# Patient Record
Sex: MALE | Race: ASIAN | NOT HISPANIC OR LATINO | Employment: UNEMPLOYED | URBAN - METROPOLITAN AREA
[De-identification: names, ages, dates, MRNs, and addresses within clinical notes are randomized per-mention and may not be internally consistent; named-entity substitution may affect disease eponyms.]

---

## 2018-10-24 ENCOUNTER — OFFICE VISIT - HEALTHEAST (OUTPATIENT)
Dept: FAMILY MEDICINE | Facility: CLINIC | Age: 48
End: 2018-10-24

## 2018-10-24 DIAGNOSIS — M25.561 BILATERAL KNEE PAIN: ICD-10-CM

## 2018-10-24 DIAGNOSIS — M25.562 BILATERAL KNEE PAIN: ICD-10-CM

## 2018-10-24 ASSESSMENT — MIFFLIN-ST. JEOR: SCORE: 1506.37

## 2018-10-30 ENCOUNTER — COMMUNICATION - HEALTHEAST (OUTPATIENT)
Dept: FAMILY MEDICINE | Facility: CLINIC | Age: 48
End: 2018-10-30

## 2018-12-12 ENCOUNTER — OFFICE VISIT - HEALTHEAST (OUTPATIENT)
Dept: FAMILY MEDICINE | Facility: CLINIC | Age: 48
End: 2018-12-12

## 2018-12-12 DIAGNOSIS — M17.0 OSTEOARTHRITIS OF BOTH KNEES, UNSPECIFIED OSTEOARTHRITIS TYPE: ICD-10-CM

## 2018-12-19 ENCOUNTER — AMBULATORY - HEALTHEAST (OUTPATIENT)
Dept: FAMILY MEDICINE | Facility: CLINIC | Age: 48
End: 2018-12-19

## 2018-12-19 ENCOUNTER — RECORDS - HEALTHEAST (OUTPATIENT)
Dept: ADMINISTRATIVE | Facility: OTHER | Age: 48
End: 2018-12-19

## 2019-05-21 ENCOUNTER — OFFICE VISIT - HEALTHEAST (OUTPATIENT)
Dept: FAMILY MEDICINE | Facility: CLINIC | Age: 49
End: 2019-05-21

## 2019-05-21 ENCOUNTER — COMMUNICATION - HEALTHEAST (OUTPATIENT)
Dept: FAMILY MEDICINE | Facility: CLINIC | Age: 49
End: 2019-05-21

## 2019-05-21 DIAGNOSIS — D64.9 ANEMIA, UNSPECIFIED TYPE: ICD-10-CM

## 2019-05-21 DIAGNOSIS — R93.89 ABNORMAL CHEST X-RAY: ICD-10-CM

## 2019-05-21 DIAGNOSIS — R05.9 COUGH: ICD-10-CM

## 2019-05-21 DIAGNOSIS — Z12.11 SCREEN FOR COLON CANCER: ICD-10-CM

## 2019-05-21 DIAGNOSIS — Z00.00 ROUTINE GENERAL MEDICAL EXAMINATION AT A HEALTH CARE FACILITY: ICD-10-CM

## 2019-05-21 DIAGNOSIS — R63.4 WEIGHT LOSS: ICD-10-CM

## 2019-05-21 DIAGNOSIS — E11.9 TYPE 2 DIABETES MELLITUS WITHOUT COMPLICATION, WITHOUT LONG-TERM CURRENT USE OF INSULIN (H): ICD-10-CM

## 2019-05-21 LAB
ALBUMIN SERPL-MCNC: 2.5 G/DL (ref 3.5–5)
ALP SERPL-CCNC: 149 U/L (ref 45–120)
ALT SERPL W P-5'-P-CCNC: 40 U/L (ref 0–45)
ANION GAP SERPL CALCULATED.3IONS-SCNC: 10 MMOL/L (ref 5–18)
AST SERPL W P-5'-P-CCNC: 42 U/L (ref 0–40)
BILIRUB SERPL-MCNC: 0.4 MG/DL (ref 0–1)
BUN SERPL-MCNC: 12 MG/DL (ref 8–22)
CALCIUM SERPL-MCNC: 9 MG/DL (ref 8.5–10.5)
CHLORIDE BLD-SCNC: 102 MMOL/L (ref 98–107)
CHOLEST SERPL-MCNC: 101 MG/DL
CO2 SERPL-SCNC: 22 MMOL/L (ref 22–31)
CREAT SERPL-MCNC: 1.52 MG/DL (ref 0.7–1.3)
ERYTHROCYTE [DISTWIDTH] IN BLOOD BY AUTOMATED COUNT: 20.2 % (ref 11–14.5)
FASTING STATUS PATIENT QL REPORTED: YES
GFR SERPL CREATININE-BSD FRML MDRD: 49 ML/MIN/1.73M2
GLUCOSE BLD-MCNC: 135 MG/DL (ref 70–125)
HBA1C MFR BLD: 10.7 % (ref 3.5–6)
HCT VFR BLD AUTO: 26.3 % (ref 40–54)
HDLC SERPL-MCNC: 32 MG/DL
HGB BLD-MCNC: 7.1 G/DL (ref 14–18)
LDLC SERPL CALC-MCNC: 50 MG/DL
MCH RBC QN AUTO: 17.6 PG (ref 27–34)
MCHC RBC AUTO-ENTMCNC: 27 G/DL (ref 32–36)
MCV RBC AUTO: 65 FL (ref 80–100)
PLATELET # BLD AUTO: 200 THOU/UL (ref 140–440)
POTASSIUM BLD-SCNC: 4.6 MMOL/L (ref 3.5–5)
PROT SERPL-MCNC: 8.5 G/DL (ref 6–8)
RBC # BLD AUTO: 4.04 MILL/UL (ref 4.4–6.2)
SODIUM SERPL-SCNC: 134 MMOL/L (ref 136–145)
T4 FREE SERPL-MCNC: 0.9 NG/DL (ref 0.7–1.8)
TRIGL SERPL-MCNC: 96 MG/DL
TSH SERPL DL<=0.005 MIU/L-ACNC: 3.06 UIU/ML (ref 0.3–5)
WBC: 13.5 THOU/UL (ref 4–11)

## 2019-05-21 ASSESSMENT — MIFFLIN-ST. JEOR: SCORE: 1389.61

## 2019-06-07 ENCOUNTER — ANESTHESIA - HEALTHEAST (OUTPATIENT)
Dept: INTENSIVE CARE | Facility: CLINIC | Age: 49
End: 2019-06-07

## 2019-06-08 ENCOUNTER — RECORDS - HEALTHEAST (OUTPATIENT)
Dept: INTENSIVE CARE | Facility: CLINIC | Age: 49
End: 2019-06-08

## 2019-07-22 ENCOUNTER — OFFICE VISIT - HEALTHEAST (OUTPATIENT)
Dept: FAMILY MEDICINE | Facility: CLINIC | Age: 49
End: 2019-07-22

## 2019-07-22 DIAGNOSIS — A15.0: ICD-10-CM

## 2019-07-22 ASSESSMENT — MIFFLIN-ST. JEOR: SCORE: 1392.63

## 2019-07-23 ENCOUNTER — RECORDS - HEALTHEAST (OUTPATIENT)
Dept: ADMINISTRATIVE | Facility: OTHER | Age: 49
End: 2019-07-23

## 2019-07-23 ENCOUNTER — COMMUNICATION - HEALTHEAST (OUTPATIENT)
Dept: FAMILY MEDICINE | Facility: CLINIC | Age: 49
End: 2019-07-23

## 2019-07-24 ENCOUNTER — COMMUNICATION - HEALTHEAST (OUTPATIENT)
Dept: FAMILY MEDICINE | Facility: CLINIC | Age: 49
End: 2019-07-24

## 2019-08-15 ENCOUNTER — COMMUNICATION - HEALTHEAST (OUTPATIENT)
Dept: FAMILY MEDICINE | Facility: CLINIC | Age: 49
End: 2019-08-15

## 2019-08-30 ENCOUNTER — COMMUNICATION - HEALTHEAST (OUTPATIENT)
Dept: FAMILY MEDICINE | Facility: CLINIC | Age: 49
End: 2019-08-30

## 2019-08-30 DIAGNOSIS — M17.0 OSTEOARTHRITIS OF BOTH KNEES, UNSPECIFIED OSTEOARTHRITIS TYPE: ICD-10-CM

## 2019-09-23 ENCOUNTER — OFFICE VISIT - HEALTHEAST (OUTPATIENT)
Dept: FAMILY MEDICINE | Facility: CLINIC | Age: 49
End: 2019-09-23

## 2019-09-23 DIAGNOSIS — N18.30 TYPE 2 DIABETES MELLITUS WITH STAGE 3 CHRONIC KIDNEY DISEASE, WITHOUT LONG-TERM CURRENT USE OF INSULIN (H): ICD-10-CM

## 2019-09-23 DIAGNOSIS — M17.11 PRIMARY OSTEOARTHRITIS OF RIGHT KNEE: ICD-10-CM

## 2019-09-23 DIAGNOSIS — E11.22 TYPE 2 DIABETES MELLITUS WITH STAGE 3 CHRONIC KIDNEY DISEASE, WITHOUT LONG-TERM CURRENT USE OF INSULIN (H): ICD-10-CM

## 2019-09-23 LAB
ANION GAP SERPL CALCULATED.3IONS-SCNC: 9 MMOL/L (ref 5–18)
BUN SERPL-MCNC: 9 MG/DL (ref 8–22)
CALCIUM SERPL-MCNC: 9.1 MG/DL (ref 8.5–10.5)
CHLORIDE BLD-SCNC: 108 MMOL/L (ref 98–107)
CO2 SERPL-SCNC: 22 MMOL/L (ref 22–31)
CREAT SERPL-MCNC: 1.7 MG/DL (ref 0.7–1.3)
CREAT UR-MCNC: 76.5 MG/DL
GFR SERPL CREATININE-BSD FRML MDRD: 43 ML/MIN/1.73M2
GLUCOSE BLD-MCNC: 102 MG/DL (ref 70–125)
HBA1C MFR BLD: 5.5 % (ref 3.5–6)
MICROALBUMIN UR-MCNC: 15.65 MG/DL (ref 0–1.99)
MICROALBUMIN/CREAT UR: 204.6 MG/G
POTASSIUM BLD-SCNC: 3.8 MMOL/L (ref 3.5–5)
SODIUM SERPL-SCNC: 139 MMOL/L (ref 136–145)

## 2019-09-23 ASSESSMENT — MIFFLIN-ST. JEOR: SCORE: 1422.12

## 2019-09-27 ENCOUNTER — RECORDS - HEALTHEAST (OUTPATIENT)
Dept: ADMINISTRATIVE | Facility: OTHER | Age: 49
End: 2019-09-27

## 2019-10-25 ENCOUNTER — RECORDS - HEALTHEAST (OUTPATIENT)
Dept: ADMINISTRATIVE | Facility: OTHER | Age: 49
End: 2019-10-25

## 2019-11-14 ENCOUNTER — COMMUNICATION - HEALTHEAST (OUTPATIENT)
Dept: FAMILY MEDICINE | Facility: CLINIC | Age: 49
End: 2019-11-14

## 2019-11-20 ENCOUNTER — RECORDS - HEALTHEAST (OUTPATIENT)
Dept: ADMINISTRATIVE | Facility: OTHER | Age: 49
End: 2019-11-20

## 2019-11-27 ENCOUNTER — OFFICE VISIT - HEALTHEAST (OUTPATIENT)
Dept: FAMILY MEDICINE | Facility: CLINIC | Age: 49
End: 2019-11-27

## 2019-11-27 DIAGNOSIS — M17.0 OSTEOARTHRITIS OF BOTH KNEES, UNSPECIFIED OSTEOARTHRITIS TYPE: ICD-10-CM

## 2019-11-27 DIAGNOSIS — Z23 NEED FOR VACCINATION: ICD-10-CM

## 2019-11-27 ASSESSMENT — MIFFLIN-ST. JEOR: SCORE: 1470.23

## 2019-12-09 ENCOUNTER — RECORDS - HEALTHEAST (OUTPATIENT)
Dept: ADMINISTRATIVE | Facility: OTHER | Age: 49
End: 2019-12-09

## 2019-12-27 ENCOUNTER — RECORDS - HEALTHEAST (OUTPATIENT)
Dept: ADMINISTRATIVE | Facility: OTHER | Age: 49
End: 2019-12-27

## 2020-02-05 ENCOUNTER — RECORDS - HEALTHEAST (OUTPATIENT)
Dept: ADMINISTRATIVE | Facility: OTHER | Age: 50
End: 2020-02-05

## 2020-02-28 ENCOUNTER — OFFICE VISIT - HEALTHEAST (OUTPATIENT)
Dept: FAMILY MEDICINE | Facility: CLINIC | Age: 50
End: 2020-02-28

## 2020-02-28 ENCOUNTER — RECORDS - HEALTHEAST (OUTPATIENT)
Dept: ADMINISTRATIVE | Facility: OTHER | Age: 50
End: 2020-02-28

## 2020-02-28 DIAGNOSIS — E11.22 TYPE 2 DIABETES MELLITUS WITH STAGE 3 CHRONIC KIDNEY DISEASE, WITHOUT LONG-TERM CURRENT USE OF INSULIN (H): ICD-10-CM

## 2020-02-28 DIAGNOSIS — M17.0 OSTEOARTHRITIS OF BOTH KNEES, UNSPECIFIED OSTEOARTHRITIS TYPE: ICD-10-CM

## 2020-02-28 DIAGNOSIS — N18.30 TYPE 2 DIABETES MELLITUS WITH STAGE 3 CHRONIC KIDNEY DISEASE, WITHOUT LONG-TERM CURRENT USE OF INSULIN (H): ICD-10-CM

## 2020-02-28 LAB
ALBUMIN SERPL-MCNC: 3.7 G/DL (ref 3.5–5)
ALP SERPL-CCNC: 174 U/L (ref 45–120)
ALT SERPL W P-5'-P-CCNC: 44 U/L (ref 0–45)
ANION GAP SERPL CALCULATED.3IONS-SCNC: 9 MMOL/L (ref 5–18)
AST SERPL W P-5'-P-CCNC: 42 U/L (ref 0–40)
BILIRUB SERPL-MCNC: 0.4 MG/DL (ref 0–1)
BUN SERPL-MCNC: 10 MG/DL (ref 8–22)
CALCIUM SERPL-MCNC: 9.4 MG/DL (ref 8.5–10.5)
CHLORIDE BLD-SCNC: 107 MMOL/L (ref 98–107)
CHOLEST SERPL-MCNC: 115 MG/DL
CO2 SERPL-SCNC: 23 MMOL/L (ref 22–31)
CREAT SERPL-MCNC: 1.3 MG/DL (ref 0.7–1.3)
FASTING STATUS PATIENT QL REPORTED: NO
GFR SERPL CREATININE-BSD FRML MDRD: 58 ML/MIN/1.73M2
GLUCOSE BLD-MCNC: 143 MG/DL (ref 70–125)
HBA1C MFR BLD: 6.7 % (ref 3.5–6)
HDLC SERPL-MCNC: 38 MG/DL
LDLC SERPL CALC-MCNC: 48 MG/DL
POTASSIUM BLD-SCNC: 3.8 MMOL/L (ref 3.5–5)
PROT SERPL-MCNC: 7.9 G/DL (ref 6–8)
SODIUM SERPL-SCNC: 139 MMOL/L (ref 136–145)
TRIGL SERPL-MCNC: 145 MG/DL

## 2020-02-28 ASSESSMENT — MIFFLIN-ST. JEOR: SCORE: 1526.93

## 2020-03-06 ENCOUNTER — RECORDS - HEALTHEAST (OUTPATIENT)
Dept: ADMINISTRATIVE | Facility: OTHER | Age: 50
End: 2020-03-06

## 2020-03-28 ENCOUNTER — COMMUNICATION - HEALTHEAST (OUTPATIENT)
Dept: FAMILY MEDICINE | Facility: CLINIC | Age: 50
End: 2020-03-28

## 2020-05-29 ENCOUNTER — RECORDS - HEALTHEAST (OUTPATIENT)
Dept: ADMINISTRATIVE | Facility: OTHER | Age: 50
End: 2020-05-29

## 2020-06-29 ENCOUNTER — COMMUNICATION - HEALTHEAST (OUTPATIENT)
Dept: FAMILY MEDICINE | Facility: CLINIC | Age: 50
End: 2020-06-29

## 2020-06-30 ENCOUNTER — OFFICE VISIT - HEALTHEAST (OUTPATIENT)
Dept: FAMILY MEDICINE | Facility: CLINIC | Age: 50
End: 2020-06-30

## 2020-06-30 DIAGNOSIS — E11.22 TYPE 2 DIABETES MELLITUS WITH STAGE 3 CHRONIC KIDNEY DISEASE, WITHOUT LONG-TERM CURRENT USE OF INSULIN (H): ICD-10-CM

## 2020-06-30 DIAGNOSIS — N18.30 TYPE 2 DIABETES MELLITUS WITH STAGE 3 CHRONIC KIDNEY DISEASE, WITHOUT LONG-TERM CURRENT USE OF INSULIN (H): ICD-10-CM

## 2020-06-30 DIAGNOSIS — R52 PAIN: ICD-10-CM

## 2020-06-30 DIAGNOSIS — D50.8 OTHER IRON DEFICIENCY ANEMIA: ICD-10-CM

## 2020-06-30 LAB
ALBUMIN SERPL-MCNC: 4 G/DL (ref 3.5–5)
ALP SERPL-CCNC: 159 U/L (ref 45–120)
ALT SERPL W P-5'-P-CCNC: 76 U/L (ref 0–45)
ANION GAP SERPL CALCULATED.3IONS-SCNC: 11 MMOL/L (ref 5–18)
AST SERPL W P-5'-P-CCNC: 48 U/L (ref 0–40)
BILIRUB SERPL-MCNC: 0.3 MG/DL (ref 0–1)
BUN SERPL-MCNC: 13 MG/DL (ref 8–22)
CALCIUM SERPL-MCNC: 9.5 MG/DL (ref 8.5–10.5)
CHLORIDE BLD-SCNC: 105 MMOL/L (ref 98–107)
CO2 SERPL-SCNC: 21 MMOL/L (ref 22–31)
CREAT SERPL-MCNC: 1.45 MG/DL (ref 0.7–1.3)
GFR SERPL CREATININE-BSD FRML MDRD: 51 ML/MIN/1.73M2
GLUCOSE BLD-MCNC: 194 MG/DL (ref 70–125)
HBA1C MFR BLD: 9.2 % (ref 3.5–6)
HGB BLD-MCNC: 10.9 G/DL (ref 14–18)
POTASSIUM BLD-SCNC: 3.9 MMOL/L (ref 3.5–5)
PROT SERPL-MCNC: 8.2 G/DL (ref 6–8)
SODIUM SERPL-SCNC: 137 MMOL/L (ref 136–145)

## 2020-06-30 RX ORDER — ACETAMINOPHEN 325 MG/1
650 TABLET ORAL EVERY 4 HOURS PRN
Qty: 100 TABLET | Refills: 11 | Status: SHIPPED | OUTPATIENT
Start: 2020-06-30 | End: 2021-10-16

## 2020-06-30 ASSESSMENT — MIFFLIN-ST. JEOR: SCORE: 1545.04

## 2020-09-16 ENCOUNTER — RECORDS - HEALTHEAST (OUTPATIENT)
Dept: ADMINISTRATIVE | Facility: OTHER | Age: 50
End: 2020-09-16

## 2020-11-09 ENCOUNTER — OFFICE VISIT - HEALTHEAST (OUTPATIENT)
Dept: FAMILY MEDICINE | Facility: CLINIC | Age: 50
End: 2020-11-09

## 2020-11-09 ENCOUNTER — AMBULATORY - HEALTHEAST (OUTPATIENT)
Dept: FAMILY MEDICINE | Facility: CLINIC | Age: 50
End: 2020-11-09

## 2020-11-09 DIAGNOSIS — E11.22 TYPE 2 DIABETES MELLITUS WITH STAGE 3A CHRONIC KIDNEY DISEASE, WITHOUT LONG-TERM CURRENT USE OF INSULIN (H): ICD-10-CM

## 2020-11-09 DIAGNOSIS — E11.9 TYPE 2 DIABETES MELLITUS (H): ICD-10-CM

## 2020-11-09 DIAGNOSIS — D50.0 IRON DEFICIENCY ANEMIA DUE TO CHRONIC BLOOD LOSS: ICD-10-CM

## 2020-11-09 DIAGNOSIS — Z23 NEED FOR IMMUNIZATION AGAINST INFLUENZA: ICD-10-CM

## 2020-11-09 DIAGNOSIS — L02.03 CARBUNCLE AND FURUNCLE OF FACE: ICD-10-CM

## 2020-11-09 DIAGNOSIS — Z23 NEED FOR VACCINATION: ICD-10-CM

## 2020-11-09 DIAGNOSIS — L02.02 CARBUNCLE AND FURUNCLE OF FACE: ICD-10-CM

## 2020-11-09 DIAGNOSIS — N18.31 TYPE 2 DIABETES MELLITUS WITH STAGE 3A CHRONIC KIDNEY DISEASE, WITHOUT LONG-TERM CURRENT USE OF INSULIN (H): ICD-10-CM

## 2020-11-09 LAB
HBA1C MFR BLD: >14 %
HGB BLD-MCNC: 14.1 G/DL (ref 14–18)

## 2020-11-09 ASSESSMENT — MIFFLIN-ST. JEOR: SCORE: 1405.33

## 2020-11-10 ENCOUNTER — COMMUNICATION - HEALTHEAST (OUTPATIENT)
Dept: SCHEDULING | Facility: CLINIC | Age: 50
End: 2020-11-10

## 2020-11-10 ENCOUNTER — COMMUNICATION - HEALTHEAST (OUTPATIENT)
Dept: FAMILY MEDICINE | Facility: CLINIC | Age: 50
End: 2020-11-10

## 2020-11-10 LAB
ALBUMIN SERPL-MCNC: 4 G/DL (ref 3.5–5)
ALP SERPL-CCNC: 261 U/L (ref 45–120)
ALT SERPL W P-5'-P-CCNC: 85 U/L (ref 0–45)
ANION GAP SERPL CALCULATED.3IONS-SCNC: 11 MMOL/L (ref 5–18)
AST SERPL W P-5'-P-CCNC: 75 U/L (ref 0–40)
BILIRUB SERPL-MCNC: 0.8 MG/DL (ref 0–1)
BUN SERPL-MCNC: 16 MG/DL (ref 8–22)
CALCIUM SERPL-MCNC: 9 MG/DL (ref 8.5–10.5)
CHLORIDE BLD-SCNC: 93 MMOL/L (ref 98–107)
CO2 SERPL-SCNC: 22 MMOL/L (ref 22–31)
CREAT SERPL-MCNC: 1.67 MG/DL (ref 0.7–1.3)
CREAT UR-MCNC: 15.3 MG/DL
GFR SERPL CREATININE-BSD FRML MDRD: 44 ML/MIN/1.73M2
GLUCOSE BLD-MCNC: 860 MG/DL (ref 70–125)
MICROALBUMIN UR-MCNC: 11.25 MG/DL (ref 0–1.99)
MICROALBUMIN/CREAT UR: 735.3 MG/G
POTASSIUM BLD-SCNC: 4 MMOL/L (ref 3.5–5)
PROT SERPL-MCNC: 7.4 G/DL (ref 6–8)
SODIUM SERPL-SCNC: 126 MMOL/L (ref 136–145)

## 2020-11-11 ENCOUNTER — COMMUNICATION - HEALTHEAST (OUTPATIENT)
Dept: FAMILY MEDICINE | Facility: CLINIC | Age: 50
End: 2020-11-11

## 2020-11-12 ENCOUNTER — COMMUNICATION - HEALTHEAST (OUTPATIENT)
Dept: SCHEDULING | Facility: CLINIC | Age: 50
End: 2020-11-12

## 2020-11-17 ENCOUNTER — OFFICE VISIT - HEALTHEAST (OUTPATIENT)
Dept: FAMILY MEDICINE | Facility: CLINIC | Age: 50
End: 2020-11-17

## 2020-11-17 DIAGNOSIS — L30.9 DERMATITIS: ICD-10-CM

## 2020-11-17 DIAGNOSIS — E11.22 TYPE 2 DIABETES MELLITUS WITH STAGE 3A CHRONIC KIDNEY DISEASE, WITHOUT LONG-TERM CURRENT USE OF INSULIN (H): ICD-10-CM

## 2020-11-17 DIAGNOSIS — N18.31 TYPE 2 DIABETES MELLITUS WITH STAGE 3A CHRONIC KIDNEY DISEASE, WITHOUT LONG-TERM CURRENT USE OF INSULIN (H): ICD-10-CM

## 2020-11-17 LAB
ANION GAP SERPL CALCULATED.3IONS-SCNC: 11 MMOL/L (ref 5–18)
BUN SERPL-MCNC: 12 MG/DL (ref 8–22)
CALCIUM SERPL-MCNC: 9.5 MG/DL (ref 8.5–10.5)
CHLORIDE BLD-SCNC: 99 MMOL/L (ref 98–107)
CO2 SERPL-SCNC: 22 MMOL/L (ref 22–31)
CREAT SERPL-MCNC: 1.29 MG/DL (ref 0.7–1.3)
GFR SERPL CREATININE-BSD FRML MDRD: 59 ML/MIN/1.73M2
GLUCOSE BLD-MCNC: 380 MG/DL (ref 70–125)
POTASSIUM BLD-SCNC: 3.9 MMOL/L (ref 3.5–5)
SODIUM SERPL-SCNC: 132 MMOL/L (ref 136–145)

## 2020-11-17 RX ORDER — TRIAMCINOLONE ACETONIDE 1 MG/G
CREAM TOPICAL
Qty: 80 G | Refills: 5 | Status: SHIPPED | OUTPATIENT
Start: 2020-11-17 | End: 2021-10-03

## 2020-12-09 ENCOUNTER — OFFICE VISIT - HEALTHEAST (OUTPATIENT)
Dept: FAMILY MEDICINE | Facility: CLINIC | Age: 50
End: 2020-12-09

## 2020-12-09 DIAGNOSIS — E11.22 TYPE 2 DIABETES MELLITUS WITH STAGE 3A CHRONIC KIDNEY DISEASE, WITHOUT LONG-TERM CURRENT USE OF INSULIN (H): ICD-10-CM

## 2020-12-09 DIAGNOSIS — N18.31 TYPE 2 DIABETES MELLITUS WITH STAGE 3A CHRONIC KIDNEY DISEASE, WITHOUT LONG-TERM CURRENT USE OF INSULIN (H): ICD-10-CM

## 2020-12-09 RX ORDER — GLUCOSAMINE HCL/CHONDROITIN SU 500-400 MG
1 CAPSULE ORAL DAILY
Qty: 50 STRIP | Refills: 11 | Status: SHIPPED | OUTPATIENT
Start: 2020-12-09 | End: 2021-11-22

## 2020-12-09 ASSESSMENT — MIFFLIN-ST. JEOR: SCORE: 1408.86

## 2021-02-12 ENCOUNTER — OFFICE VISIT - HEALTHEAST (OUTPATIENT)
Dept: FAMILY MEDICINE | Facility: CLINIC | Age: 51
End: 2021-02-12

## 2021-02-12 DIAGNOSIS — Z23 NEED FOR VACCINATION: ICD-10-CM

## 2021-02-12 DIAGNOSIS — N18.31 TYPE 2 DIABETES MELLITUS WITH STAGE 3A CHRONIC KIDNEY DISEASE, WITHOUT LONG-TERM CURRENT USE OF INSULIN (H): ICD-10-CM

## 2021-02-12 DIAGNOSIS — Z12.11 SCREEN FOR COLON CANCER: ICD-10-CM

## 2021-02-12 DIAGNOSIS — E11.22 TYPE 2 DIABETES MELLITUS WITH STAGE 3A CHRONIC KIDNEY DISEASE, WITHOUT LONG-TERM CURRENT USE OF INSULIN (H): ICD-10-CM

## 2021-02-12 LAB
CHOLEST SERPL-MCNC: 146 MG/DL
CREAT UR-MCNC: 59.9 MG/DL
FASTING STATUS PATIENT QL REPORTED: YES
HBA1C MFR BLD: 8.4 %
HDLC SERPL-MCNC: 40 MG/DL
LDLC SERPL CALC-MCNC: 80 MG/DL
MICROALBUMIN UR-MCNC: 60.7 MG/DL (ref 0–1.99)
MICROALBUMIN/CREAT UR: 1013.4 MG/G
TRIGL SERPL-MCNC: 129 MG/DL

## 2021-02-12 RX ORDER — METFORMIN HCL 500 MG
1500 TABLET, EXTENDED RELEASE 24 HR ORAL
Qty: 90 TABLET | Refills: 11 | Status: SHIPPED | OUTPATIENT
Start: 2021-02-12 | End: 2022-02-06

## 2021-02-12 ASSESSMENT — MIFFLIN-ST. JEOR: SCORE: 1463.75

## 2021-04-02 ENCOUNTER — RECORDS - HEALTHEAST (OUTPATIENT)
Dept: ADMINISTRATIVE | Facility: OTHER | Age: 51
End: 2021-04-02

## 2021-05-29 NOTE — TELEPHONE ENCOUNTER
Incoming Vocera Call    Call :    Caller's Name: Emery from [facility]  PCP: Dr. Florez  Covering Provider:  Kelsey #:   Penelope Call:     Message: Emery from [facility] is calling for Dr. Florez. Checked and Dr. Florez and his CSS are with pts. Asked Emery if it was urgent I will pull provider from room. He stated that it should be okay for Dr. Florez to call back. Ph# [phone]. Note placed on Dr. Florez's desk.
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
## 2021-05-30 NOTE — PROGRESS NOTES
Assessment: /    Plan:    1. Pulmonary tuberculosis with cavitation         He has appointment at [facility] 7/23.    Recheck in 2 months.    Patient was seen with professional Brianda , Armin Cerna.      Subjective:    HPI:  Jam Mckeno is a 49-year-old male presenting for follow-up of hospitalization for TB.  He was at [facility] 5/21 - 5/31 for TB with cavitation.  He was at [facility] 5/31 - 6/6, developed hemoptysis, and was at [facility] 6/6 - 6/11.  He was then back at [facility] until 7/1, when sputa were negative.  He is on directly observed therapy.    He is feeling stronger, and eating more.    Glucose has been normal, not needing medications.  A1c was 7.9 on 6/1/19.    He takes tylenol once per day for knee pain.      Review of Systems:  No fever or hemoptysis.      Current Outpatient Medications   Medication Sig Dispense Refill     acetaminophen (TYLENOL) 325 MG tablet Take 2 tablets (650 mg total) by mouth every 4 (four) hours as needed.  0     ethambutol (MYAMBUTOL) 400 MG tablet Take 3 tablets (1,200 mg total) by mouth daily. 30 tablet 0     ferrous sulfate 325 (65 FE) MG tablet Take 1 tablet (325 mg total) by mouth daily with breakfast. 30 tablet 0     isoniazid (NYDRAZID) 300 MG tablet Take 1 tablet (300 mg total) by mouth daily. 30 tablet 0     pyrazinamide 500 mg tablet Take 3 tablets (1,500 mg total) by mouth daily. 30 tablet 0     pyridoxine, vitamin B6, (B-6) 50 MG tablet Take 1 tablet (50 mg total) by mouth daily.  0     No current facility-administered medications for this visit.          Objective:    Vitals:    07/22/19 1122   BP: 122/70   Pulse: 93   Resp: 20   Temp: 98  F (36.7  C)   SpO2: 99%       Gen:  NAD, VSS  Eyes with mild conjunctival pallor  Lungs:  normal  Heart:  normal          ADDITIONAL HISTORY SUMMARIZED (2): Reviewed discharge summary.  DECISION TO OBTAIN EXTRA INFORMATION (1): None.   RADIOLOGY TESTS (1): Reviewed CXR's.  LABS (1): Reviewed labs.  MEDICINE TESTS  (1): None.  INDEPENDENT REVIEW (2 each): None.     Total Data Points: 4

## 2021-05-30 NOTE — TELEPHONE ENCOUNTER
Upcoming Appointment Question  When is the appointment: Today  What is your appointment for?: [facility] 3:00 pm 7/23  Who is your appointment scheduled with?: Ask for Jessie  What is your question/concern?: Jessie from TB Clinic is requesting office notes from 7/22 be faxed as soon as possible to [phone] for a 3 pm appt today 7/23.  Okay to leave a detailed message?: Yes

## 2021-05-30 NOTE — TELEPHONE ENCOUNTER
Question following Office Visit  When did you see your provider: 7/22/19  What is your question: TB clinic received notes from visit, needs to clarify that TB clinic is dispensing TB medications and that  patient was not given by PCP.   Okay to leave a detailed message: Yes

## 2021-05-31 NOTE — TELEPHONE ENCOUNTER
CMT attempted to contact the daughter Angela and was forwarded to [employer]. Writer left a detailed message to advise the caller per provider notes below and left call back number in case there are questions. This task is complete.

## 2021-05-31 NOTE — TELEPHONE ENCOUNTER
Describe your symptoms: Daughter reported to TB nurse who reported it to writer that patient is having an increase in leg pain and having increased issues with gait. Unsteady in shower.   The daughter is asking for referral for PCA and order for shower chair to assist patient with bathing. Daughter is concerned he may fall if he continues to do showering alone .  TB nurse does not know DME or any preference on DME.  Please advise.     Any pain: yes  New/Ongoing: Ongoing  How long have you been having symptoms: unknown    Have you been seen for this:  Yes.  Have your symptoms changed since this visit? Yes: increased in pain and increasing lack mobility  Triage offered?: yes patient was not with nurse.  Please call patient for assessment  Home remedies tried: unknown  Pharmacy Name and Location: unknown  Okay to leave a detailed message? No Patient is a Brianda speaker.

## 2021-05-31 NOTE — TELEPHONE ENCOUNTER
For provider-please see [facility] nurse note below and let clinic staff know if MD requires further action. Thank you.

## 2021-05-31 NOTE — TELEPHONE ENCOUNTER
CMT left message x 3. Please review message thread below and advise the patient as indicated. Please schedule if necessary or indicated in message thread. CMT has not received response from caller.

## 2021-05-31 NOTE — TELEPHONE ENCOUNTER
I sent Rx to [facility] for Tramadol 50 mg at bedtime as needed, #7.  It is narcotic, can cause drowsiness, confusion, constipation, dependence.  Call if refill needed.    Shower is durable medical equipment, needs a face-to-face visit with me.  Can do that at his 9/23 appt, or make appt sooner.  shola   WDL

## 2021-05-31 NOTE — TELEPHONE ENCOUNTER
Triage note:    RN returned call to Daughter regarding 49 year old father with TB.  She states that the PCA assessment was done yesterday.      She reports that he has chronic right leg pain since he was in [facility] [place].     She states the pain has been worsening the last 3 years. The pain is so bad he couldn't sleep at night.  Now the pain is in both legs (R>L).  He takes Tylenol and uses cream.  That helps him to sleep some.  He used to take Ibuprofen but can't anymore due to his kidneys.     She says that he has very severe pain in the right leg, but the left leg is about high moderate degree of pain  He is able to walk a little with support by family. The pain is worse in the evening and he is unstable in the shower.  Daughter is asking for the followin.) stronger pain medicine if it is okay for his kidneys (daughter is concerned about the use of medicine with his kidney issues)     2.) order for a shower chair      Pharmacy:  [facility] [address]    *May use any Medical supply store in [place] if needed      *Ok to leave a detailed message upon call back      Meg Cabral RN, [facility] Refill/Triage, 2019 12:50 PM    Reason for Disposition    Nursing judgment    Protocols used: NO PROTOCOL AVAILABLE - SICK ADULT-A-[place]

## 2021-05-31 NOTE — TELEPHONE ENCOUNTER
FYI - Status Update  Who is Calling: Mirela, , [facility]  Update: Patient is doing well.  Am checking to see if Dr wanted patient to see Nephrology at this time . had note on chart about this and wanted to follow up.  Okay to leave a detailed message?:  Yes     Wk:[phone]  Cell: [phone]  Fax? [phone]

## 2021-05-31 NOTE — TELEPHONE ENCOUNTER
He saw nephrology in the hospital.  No need for appointment now.  Will continue to monitor renal function.  shola

## 2021-06-01 NOTE — PROGRESS NOTES
Assessment: /    Plan:    1. Type 2 diabetes mellitus with stage 3 chronic kidney disease, without long-term current use of insulin (H)  Basic Metabolic Panel    Microalbumin, Random Urine    Glycosylated Hemoglobin A1c   2. Primary osteoarthritis of right knee               Subjective:    HPI:  Jam Mckeon is a 49-year-old male presenting for follow-up on diabetes.  He has not been taking medications.     Social Hx:      Review of Systems:        Current Outpatient Medications   Medication Sig Dispense Refill     acetaminophen (TYLENOL) 325 MG tablet Take 2 tablets (650 mg total) by mouth every 4 (four) hours as needed.  0     traMADol (ULTRAM) 50 mg tablet Take 1 tablet (50 mg total) by mouth at bedtime as needed for pain. 7 tablet 0     ethambutol (MYAMBUTOL) 400 MG tablet Take 3 tablets (1,200 mg total) by mouth daily. 30 tablet 0     ferrous sulfate 325 (65 FE) MG tablet Take 1 tablet (325 mg total) by mouth daily with breakfast. 30 tablet 0     pyrazinamide 500 mg tablet Take 3 tablets (1,500 mg total) by mouth daily. 30 tablet 0     pyridoxine, vitamin B6, (B-6) 50 MG tablet Take 1 tablet (50 mg total) by mouth daily.  0     No current facility-administered medications for this visit.          Objective:    Vitals:    09/23/19 1548   BP: 130/80   Pulse: 100   Resp: 20   Temp: 98.1  F (36.7  C)   SpO2: 100%       Gen:  NAD, VSS  Lungs:  normal  Heart:  normal  Abdomen:  No HSM, mass or tenderness        ADDITIONAL HISTORY SUMMARIZED (2): None.  DECISION TO OBTAIN EXTRA INFORMATION (1): None.   RADIOLOGY TESTS (1): None.  LABS (1): None.  MEDICINE TESTS (1): None.  INDEPENDENT REVIEW (2 each): None.     Total Data Points:

## 2021-06-02 VITALS — WEIGHT: 150.7 LBS | BODY MASS INDEX: 23.78 KG/M2

## 2021-06-02 VITALS — WEIGHT: 146.3 LBS | BODY MASS INDEX: 23.09 KG/M2 | HEIGHT: 67 IN | BODY MASS INDEX: 23.78 KG/M2

## 2021-06-02 VITALS — BODY MASS INDEX: 24.28 KG/M2 | WEIGHT: 154.7 LBS | HEIGHT: 67 IN

## 2021-06-03 VITALS — HEIGHT: 66 IN | WEIGHT: 132.25 LBS | BODY MASS INDEX: 21.25 KG/M2

## 2021-06-03 VITALS
OXYGEN SATURATION: 100 % | DIASTOLIC BLOOD PRESSURE: 80 MMHG | HEIGHT: 66 IN | TEMPERATURE: 98.1 F | WEIGHT: 138.75 LBS | SYSTOLIC BLOOD PRESSURE: 130 MMHG | HEART RATE: 100 BPM | RESPIRATION RATE: 20 BRPM | BODY MASS INDEX: 22.3 KG/M2

## 2021-06-03 VITALS — HEIGHT: 66 IN | WEIGHT: 131.5 LBS | BODY MASS INDEX: 21.13 KG/M2

## 2021-06-03 NOTE — TELEPHONE ENCOUNTER
Patient Returning Call  Reason for call:  Mirela is returning call to check on the status of this request.  Information relayed to patient:  Please return call to caller with the status of this message and what she can tell patient. Patient is reporting the same issues daily to the [employer] worker that sees him daily.    Patient has additional questions:  yes  If YES, what are your questions/concerns:  Patient also needs help with filling out his disability forms and caller is requesting information on clinic resources or a  to assist.   Okay to leave a detailed message?: Yes

## 2021-06-03 NOTE — TELEPHONE ENCOUNTER
[facility] called Mirela back. We discussed the patient and his progress. The speaker states that caregivers and family members most immediately involved in his care have been educated regarding suicidal ideation watch and directions for seeking are if the patient needs it.     The patient has complex health alterations and has been struggling with chronic pain involving his knees. The patient is a candidate for surgical correction but the surgical team has indicated that his TB must be entirely treated prior to surgical intervention. This has been rather frustrating for the patient per his report.     [facility] has faxed requested notes (09/23/19 office visit and labs) to Mirela as requested. Fax number verified.     Mirela states that this is an update and is non-emergent, confident that it will be okay to hold for MD as the patient is currently stable. Dr. Florez will return to the medical office 11/15/2019.

## 2021-06-03 NOTE — PROGRESS NOTES
"Assessment: /    Plan:    1. Osteoarthritis of both knees, unspecified osteoarthritis type  Ambulatory referral to Orthopedic Surgery   2. Need for vaccination  Influenza, Recombinant, Inj, Quadrivalent, PF, 18+YRS       I explained that steroid injections or lubricant injections sometimes are helpful for the knees.  It is possible that he might need joint replacement at some point in the future.    Recheck in 3 months.      Subjective:    HPI:  Jam Mckeon is a 49-year-old male presenting with bilateral knee pain.  He had x-rays in October 2018 demonstrating severe right knee osteoarthritis and moderate osteoarthritis on the left.    He has been receiving treatment for pulmonary tuberculosis since June, directly observed therapy through the public health department.    Social Hx: He is accompanied by his daughter.    Review of Systems: No fever or rash.      Current Outpatient Medications   Medication Sig Dispense Refill     acetaminophen (TYLENOL) 325 MG tablet Take 2 tablets (650 mg total) by mouth every 4 (four) hours as needed.  0     ethambutol (MYAMBUTOL) 400 MG tablet Take 3 tablets (1,200 mg total) by mouth daily. 30 tablet 0     ferrous sulfate 325 (65 FE) MG tablet Take 1 tablet (325 mg total) by mouth daily with breakfast. 30 tablet 0     pyrazinamide 500 mg tablet Take 3 tablets (1,500 mg total) by mouth daily. 30 tablet 0     pyridoxine, vitamin B6, (B-6) 50 MG tablet Take 1 tablet (50 mg total) by mouth daily.  0     traMADol (ULTRAM) 50 mg tablet Take 1 tablet (50 mg total) by mouth at bedtime as needed for pain. 7 tablet 0     No current facility-administered medications for this visit.          Objective:    Vitals:    11/27/19 0834 11/27/19 0838 11/27/19 0911   BP: 146/84 140/84 132/82   Pulse: 89     Resp: 20     Temp: 98.6  F (37  C)     TempSrc: Oral     SpO2: 99%     Weight: 149 lb (67.6 kg)     Height: 5' 6.1\" (1.679 m)         Gen:  NAD, VSS  Lungs:  normal  Heart:  normal  Knees with joint " line tenderness bilateral        ADDITIONAL HISTORY SUMMARIZED (2): None.  DECISION TO OBTAIN EXTRA INFORMATION (1): None.   RADIOLOGY TESTS (1): Reviewed knee x-rays.  LABS (1): None.  MEDICINE TESTS (1): None.  INDEPENDENT REVIEW (2 each): None.     Total Data Points: 1

## 2021-06-03 NOTE — TELEPHONE ENCOUNTER
[facility]  Reason for Call:  2 reasons for this call:    Patient has verbalized not wanting to live anymore due to bilateral knee pain to the [facility] nurse that visits him daily to administer his TB medication.  This has been reported to his daughter Elvia and she was aware to watch for signs and report as needed.  The TB provider was also notified and suggested this message to Alonzo Florez MD for mental health follow up.    Caller is requesting office notes from the 9/23/19 visit with labs. Caller stated This request was made in July and the provider treating the TB needs this information. Please fax over this to [phone]   Date of last appointment with primary care: 9/23/19  Okay to leave a detailed message: Yes

## 2021-06-04 VITALS
HEIGHT: 66 IN | RESPIRATION RATE: 20 BRPM | BODY MASS INDEX: 26.6 KG/M2 | OXYGEN SATURATION: 97 % | TEMPERATURE: 98.3 F | DIASTOLIC BLOOD PRESSURE: 66 MMHG | WEIGHT: 165.5 LBS | SYSTOLIC BLOOD PRESSURE: 124 MMHG | HEART RATE: 95 BPM

## 2021-06-04 VITALS
TEMPERATURE: 98.6 F | HEIGHT: 66 IN | RESPIRATION RATE: 20 BRPM | BODY MASS INDEX: 23.95 KG/M2 | WEIGHT: 149 LBS | DIASTOLIC BLOOD PRESSURE: 82 MMHG | HEART RATE: 89 BPM | SYSTOLIC BLOOD PRESSURE: 132 MMHG | OXYGEN SATURATION: 99 %

## 2021-06-04 VITALS
HEART RATE: 113 BPM | OXYGEN SATURATION: 98 % | SYSTOLIC BLOOD PRESSURE: 138 MMHG | TEMPERATURE: 98.1 F | BODY MASS INDEX: 25.96 KG/M2 | HEIGHT: 66 IN | WEIGHT: 161.5 LBS | DIASTOLIC BLOOD PRESSURE: 80 MMHG | RESPIRATION RATE: 22 BRPM

## 2021-06-05 VITALS
DIASTOLIC BLOOD PRESSURE: 74 MMHG | WEIGHT: 137.4 LBS | HEART RATE: 74 BPM | SYSTOLIC BLOOD PRESSURE: 118 MMHG | BODY MASS INDEX: 22.08 KG/M2 | TEMPERATURE: 97.8 F | HEIGHT: 66 IN | OXYGEN SATURATION: 99 %

## 2021-06-05 VITALS
HEIGHT: 66 IN | WEIGHT: 149.5 LBS | HEART RATE: 73 BPM | RESPIRATION RATE: 16 BRPM | DIASTOLIC BLOOD PRESSURE: 72 MMHG | BODY MASS INDEX: 24.03 KG/M2 | TEMPERATURE: 98.6 F | OXYGEN SATURATION: 99 % | SYSTOLIC BLOOD PRESSURE: 110 MMHG

## 2021-06-05 VITALS
DIASTOLIC BLOOD PRESSURE: 66 MMHG | OXYGEN SATURATION: 97 % | SYSTOLIC BLOOD PRESSURE: 108 MMHG | HEART RATE: 73 BPM | TEMPERATURE: 98.5 F | BODY MASS INDEX: 22.99 KG/M2 | WEIGHT: 138 LBS | HEIGHT: 65 IN

## 2021-06-05 VITALS
DIASTOLIC BLOOD PRESSURE: 64 MMHG | RESPIRATION RATE: 18 BRPM | SYSTOLIC BLOOD PRESSURE: 112 MMHG | WEIGHT: 140.25 LBS | HEART RATE: 80 BPM | OXYGEN SATURATION: 97 % | BODY MASS INDEX: 22.64 KG/M2

## 2021-06-06 NOTE — PROGRESS NOTES
"Assessment: /    Plan:    1. Type 2 diabetes mellitus with stage 3 chronic kidney disease, without long-term current use of insulin (H)  Comprehensive Metabolic Panel    Lipid [place] RANDOM    Glycosylated Hemoglobin A1c   2. Osteoarthritis of both knees, unspecified osteoarthritis type         I wrote a prescription for a cane, which he will take to [facility].    Recheck diabetes in 4 months.    Patient was seen with professional Malag[place] , Domi Lu.      Subjective:    HPI:  Jam Mckeon is a 50-year-old male presenting for follow-up on diabetes.  He has been careful about his diet, and has not needed medications.  Prior A1c was 5.5.    He will be finishing medications for TB in 2 weeks, INH and rifampin.  He has been following at [facility].    He had knee injections at orthopedics.  Cold outdoor temperatures have worsened his right knee pain.  He requests a cane.      Review of Systems: No fever or chest pain.      Current Outpatient Medications   Medication Sig Dispense Refill     acetaminophen (TYLENOL) 325 MG tablet Take 2 tablets (650 mg total) by mouth every 4 (four) hours as needed.  0     ferrous sulfate 325 (65 FE) MG tablet Take 1 tablet (325 mg total) by mouth daily with breakfast. 30 tablet 0     traMADol (ULTRAM) 50 mg tablet Take 1 tablet (50 mg total) by mouth at bedtime as needed for pain. 7 tablet 0     No current facility-administered medications for this visit.          Objective:    Vitals:    02/28/20 0843 02/28/20 0846   BP: 144/84 138/80   Pulse: (!) 113    Resp: 22    Temp: 98.1  F (36.7  C)    TempSrc: Oral    SpO2: 98%    Weight: 161 lb 8 oz (73.3 kg)    Height: 5' 6.1\" (1.679 m)        Gen:  NAD, VSS  Lungs:  normal  Heart:  normal  Knees with joint line tenderness bilateral    A1c is 6.7      ADDITIONAL HISTORY SUMMARIZED (2): None.  DECISION TO OBTAIN EXTRA INFORMATION (1): None.   RADIOLOGY TESTS (1): None.  LABS (1): A1c.  MEDICINE TESTS (1): " None.  INDEPENDENT REVIEW (2 each): None.     Total Data Points: 1

## 2021-06-08 ENCOUNTER — COMMUNICATION - HEALTHEAST (OUTPATIENT)
Dept: FAMILY MEDICINE | Facility: CLINIC | Age: 51
End: 2021-06-08

## 2021-06-08 DIAGNOSIS — D50.8 OTHER IRON DEFICIENCY ANEMIA: ICD-10-CM

## 2021-06-09 RX ORDER — FERROUS SULFATE 325(65) MG
TABLET ORAL
Qty: 30 TABLET | Refills: 11 | Status: SHIPPED | OUTPATIENT
Start: 2021-06-09 | End: 2022-05-30

## 2021-06-09 NOTE — PROGRESS NOTES
Assessment: /    Plan:    1. Type 2 diabetes mellitus with stage 3 chronic kidney disease, without long-term current use of insulin (H)  Comprehensive Metabolic Panel    Glycosylated Hemoglobin A1c   2. Other iron deficiency anemia  ferrous sulfate 325 (65 FE) MG tablet    Hemoglobin   3. Pain  acetaminophen (TYLENOL) 325 MG tablet       Recheck in 4 months.    Patient was seen with professional Yemeni telephone .      Subjective:    HPI:  Sak The Toe is a 50-year-old male presenting for follow-up on diabetes.    He finished TB treatment.      Review of Systems:  No fever or cough.      Current Outpatient Medications   Medication Sig Dispense Refill     acetaminophen (TYLENOL) 325 MG tablet Take 2 tablets (650 mg total) by mouth every 4 (four) hours as needed. 100 tablet 11     ferrous sulfate 325 (65 FE) MG tablet Take 1 tablet (325 mg total) by mouth daily with breakfast. 30 tablet 11     No current facility-administered medications for this visit.          Objective:    Vitals:    06/30/20 0923   BP: 124/66   Pulse: 95   Resp: 20   Temp: 98.3  F (36.8  C)   SpO2: 97%       Gen:  NAD, VSS.  Using a cane.  Weight increased 4 # since last visit.  Lungs:  normal  Heart:  normal          ADDITIONAL HISTORY SUMMARIZED (2): None.  DECISION TO OBTAIN EXTRA INFORMATION (1): None.   RADIOLOGY TESTS (1): None.  LABS (1): ordered.  MEDICINE TESTS (1): None.  INDEPENDENT REVIEW (2 each): None.     Total Data Points: 1

## 2021-06-11 ENCOUNTER — AMBULATORY - HEALTHEAST (OUTPATIENT)
Dept: NURSING | Facility: CLINIC | Age: 51
End: 2021-06-11

## 2021-06-11 ENCOUNTER — OFFICE VISIT - HEALTHEAST (OUTPATIENT)
Dept: FAMILY MEDICINE | Facility: CLINIC | Age: 51
End: 2021-06-11

## 2021-06-11 DIAGNOSIS — E11.29 MICROALBUMINURIA DUE TO TYPE 2 DIABETES MELLITUS (H): ICD-10-CM

## 2021-06-11 DIAGNOSIS — R80.9 MICROALBUMINURIA DUE TO TYPE 2 DIABETES MELLITUS (H): ICD-10-CM

## 2021-06-11 DIAGNOSIS — M17.0 OSTEOARTHRITIS OF BOTH KNEES, UNSPECIFIED OSTEOARTHRITIS TYPE: ICD-10-CM

## 2021-06-11 DIAGNOSIS — N18.31 TYPE 2 DIABETES MELLITUS WITH STAGE 3A CHRONIC KIDNEY DISEASE, WITHOUT LONG-TERM CURRENT USE OF INSULIN (H): ICD-10-CM

## 2021-06-11 DIAGNOSIS — E11.22 TYPE 2 DIABETES MELLITUS WITH STAGE 3A CHRONIC KIDNEY DISEASE, WITHOUT LONG-TERM CURRENT USE OF INSULIN (H): ICD-10-CM

## 2021-06-11 LAB — HBA1C MFR BLD: 6 %

## 2021-06-11 RX ORDER — LISINOPRIL 2.5 MG/1
2.5 TABLET ORAL DAILY
Qty: 90 TABLET | Refills: 3 | Status: SHIPPED | OUTPATIENT
Start: 2021-06-11 | End: 2022-06-01

## 2021-06-11 ASSESSMENT — MIFFLIN-ST. JEOR: SCORE: 1497.76

## 2021-06-13 NOTE — TELEPHONE ENCOUNTER
Patient scheduled for appointment with primary care provider 11/17/20. Will route to primary care provider. Dr. Florez is out of office today.

## 2021-06-13 NOTE — PROGRESS NOTES
Assessment: /    Plan:    1. Type 2 diabetes mellitus with stage 3a chronic kidney disease, without long-term current use of insulin (H)  metFORMIN (GLUCOPHAGE XR) 500 MG 24 hr tablet    Basic Metabolic Panel   2. Dermatitis  triamcinolone (KENALOG) 0.1 % cream       Increase Metformin to 2 tablets daily.    Recheck December 9.      Subjective:    HPI:  Jam Mckeon is a 50-year-old male presenting for follow-up on ER visit with hyperglycemia.  His daughter has checked his fingerstick blood sugars using her machine, and has been getting high readings.  He recently started Metformin.    He has irritated areas on the left lower leg.       Review of Systems: No fever or cough.      Current Outpatient Medications   Medication Sig Dispense Refill     acetaminophen (TYLENOL) 325 MG tablet Take 2 tablets (650 mg total) by mouth every 4 (four) hours as needed. 100 tablet 11     ferrous sulfate 325 (65 FE) MG tablet Take 1 tablet (325 mg total) by mouth daily with breakfast. 30 tablet 11     metFORMIN (GLUCOPHAGE XR) 500 MG 24 hr tablet Take 2 tablets (1,000 mg total) by mouth daily with breakfast. 60 tablet 11     triamcinolone (KENALOG) 0.1 % cream Apply 1 gram to lower legs 2 times daily 80 g 5     No current facility-administered medications for this visit.          Objective:    Vitals:    11/17/20 1042   BP: 112/64   Pulse: 80   Resp: 18   SpO2: 97%       Gen:  NAD, VSS  Using a cane  Lungs:  normal  Heart:  normal  Left lower leg with scaly plaques.        ADDITIONAL HISTORY SUMMARIZED (2):  reviewed ER note.  DECISION TO OBTAIN EXTRA INFORMATION (1): None.   RADIOLOGY TESTS (1): None.  LABS (1):  BMP.  MEDICINE TESTS (1): None.  INDEPENDENT REVIEW (2 each): None.     Total Data Points: 3

## 2021-06-13 NOTE — PROGRESS NOTES
Assessment: /    Plan:    1. Type 2 diabetes mellitus with stage 3a chronic kidney disease, without long-term current use of insulin (H)  Comprehensive Metabolic Panel    Glycosylated Hemoglobin A1c    metFORMIN (GLUCOPHAGE XR) 500 MG 24 hr tablet    Microalbumin, Random Urine   2. Iron deficiency anemia due to chronic blood loss  Hemoglobin   3. Carbuncle and furuncle of face         Begin Metformin.  Potential side effects discussed.    Hot packs to the facial lesions 3 times daily.    Recheck diabetes in 1 month.  Recheck sooner if any problem.    He agrees to do colonoscopy.  We will not order this at this time due to diabetes not being well controlled, which would require the procedure to be done at the hospital.    This was a 31 minute visit with >50% of the time spent in face-to-face counseling and coordination of care regarding: Diabetes, anemia, carbuncle.      Subjective:    HPI:  Jam Mckeon is a 50-year-old male presenting for follow-up on diabetes.    He has intentionally lost weight this year, in order to decrease pressure on his painful right knee.  Weight has decreased 27 pounds.  He accomplished this by decreasing his intake.    He has had a small swelling on the right side of the chin for 3 weeks, and on the left side of the chin for 1 week.      Social Hx: He is accompanied by his daughter.    Review of Systems: No fever or cough.      Current Outpatient Medications   Medication Sig Dispense Refill     acetaminophen (TYLENOL) 325 MG tablet Take 2 tablets (650 mg total) by mouth every 4 (four) hours as needed. 100 tablet 11     ferrous sulfate 325 (65 FE) MG tablet Take 1 tablet (325 mg total) by mouth daily with breakfast. 30 tablet 11     metFORMIN (GLUCOPHAGE XR) 500 MG 24 hr tablet Take 1 tablet (500 mg total) by mouth daily with breakfast. 30 tablet 11     No current facility-administered medications for this visit.          Objective:    Vitals:    11/09/20 1342   BP: 108/66   Pulse: 73    Temp: 98.5  F (36.9  C)   SpO2: 97%       Gen:  NAD, VSS  Lungs:  normal  Heart:  normal    Skin with a carbuncle on the right side of the chin.  Light pressure on the area releases a small amount of pus.  No tenderness, no surrounding erythema.  No cervical adenopathy.  There is a furuncle on the left side of the chin.    Feet with no ulceration, normal monofilament testing.      A1c is greater than 14.  Hemoglobin is 14.1, improved.      ADDITIONAL HISTORY SUMMARIZED (2): None.  DECISION TO OBTAIN EXTRA INFORMATION (1): None.   RADIOLOGY TESTS (1): None.  LABS (1): Ordered.  MEDICINE TESTS (1): None.  INDEPENDENT REVIEW (2 each): None.     Total Data Points: 1

## 2021-06-13 NOTE — TELEPHONE ENCOUNTER
"Call received from Alek  with [facility] lab.    He reports a Critical lab: Glucose = 860 (drawn 11/9 @ 13:32)    6/30: Glulcose = 194; A1c = 9.2  2/28: Glulcose = 143; A1c = 6.7    10:13 pm - Attempted to call pt. - No answer    10:16 pm - Spoke to pager , Rakesh. On-call provider, Dr. Keyona Boateng paged    10:22 pm - Call received from Dr. Boateng.  - Attempt to contact pt again.  - If able to contact them, ask about symptoms.  - If they have a glucometer, ask if they have measured their blood glucose. If blood glucose <400 AND without symptoms, send High Priority message to provider  - If symptomatic &/or blood glucose >400, proceed to ER    10:50 pm - Attempted to call pt; Non-detailed VM left to return call to [phone]    11:01 pm - Call received from daughter, Angela Stevenson to Communicate on chart. Notified of Critical Lab    Sak denies symptoms of illness - \"He feels well\". He urinates frequently when he drinks but this has been going on for quite a while    FSBG measured during phone call. Meter reads \"High\"    ER advised    Michelle Major RN  [facility] Nurse Advisor    "

## 2021-06-13 NOTE — PROGRESS NOTES
Assessment: /    Plan:    1. Type 2 diabetes mellitus with stage 3a chronic kidney disease, without long-term current use of insulin (H)  blood glucose meter (GLUCOMETER)    blood glucose test strips    generic lancets (FINGERSTIX LANCETS)       Recheck in 2 months.      Subjective:    HPI:  Jam Mckeon is a 50-year-old male presenting for follow-up on diabetes.  He takes 2 metformin per day.  No GI side effects.        Review of Systems:  No fever or cough.      Current Outpatient Medications   Medication Sig Dispense Refill     acetaminophen (TYLENOL) 325 MG tablet Take 2 tablets (650 mg total) by mouth every 4 (four) hours as needed. 100 tablet 11     blood glucose meter (GLUCOMETER) Use 1 each As Directed daily. Dispense glucometer brand per patient's insurance at pharmacy discretion. 1 each 0     blood glucose test strips Use 1 each As Directed daily. Dispense brand per patient's insurance at pharmacy discretion. 50 strip 11     ferrous sulfate 325 (65 FE) MG tablet Take 1 tablet (325 mg total) by mouth daily with breakfast. 30 tablet 11     generic lancets (FINGERSTIX LANCETS) Use daily.  Dispense brand per patient's insurance at pharmacy discretion. 50 each 11     metFORMIN (GLUCOPHAGE XR) 500 MG 24 hr tablet Take 2 tablets (1,000 mg total) by mouth daily with breakfast. 60 tablet 11     triamcinolone (KENALOG) 0.1 % cream Apply 1 gram to lower legs 2 times daily 80 g 5     No current facility-administered medications for this visit.          Objective:    Vitals:    12/09/20 1238   BP: 118/74   Pulse: 74   Temp: 97.8  F (36.6  C)   SpO2: 99%       Gen:  NAD, VSS  Lungs:  normal  Heart:  normal          ADDITIONAL HISTORY SUMMARIZED (2): None.  DECISION TO OBTAIN EXTRA INFORMATION (1): None.   RADIOLOGY TESTS (1): None.  LABS (1): None.  MEDICINE TESTS (1): None.  INDEPENDENT REVIEW (2 each): None.     Total Data Points: 0

## 2021-06-13 NOTE — TELEPHONE ENCOUNTER
Who is calling:  Patient's daughterAngela  Reason for Call:    Patient in ED today with blood sugar of over 600.  Patient's daughter requesting Provider to review ED visit.  Transferred caller to scheduling to make follow up visit with Provider.  Date of last appointment with primary care: 11/9/2020  Okay to leave a detailed message: Yes

## 2021-06-15 NOTE — PROGRESS NOTES
Assessment & Plan     Jam was seen today for follow-up.    Diagnoses and all orders for this visit:    Type 2 diabetes mellitus with stage 3a chronic kidney disease, without long-term current use of insulin (H)  -     Glycosylated Hemoglobin A1c  -     Microalbumin, Random Urine  -     Lipid [place] FASTING  -     metFORMIN (GLUCOPHAGE XR) 500 MG 24 hr tablet; Take 3 tablets (1,500 mg total) by mouth daily with breakfast.    Screen for colon cancer  -     Ambulatory referral for Colonoscopy    Need for vaccination  -     Tdap vaccine,  6yo or older,  IM  -     Pneumococcal polysaccharide vaccine 23-valent 3 yo or older, subq/IM      Increase metformin to 3 per day.           Return in about 4 months (around 6/12/2021) for DM.    Alonzo Florez MD  [facility]    Subjective   Jam Theh Toe is 51 y.o. and presents today for the following health issues   HPI     Fingerstick 107 today.  Taking 2 metformin.  A1c was >14 last time.      Current Outpatient Medications   Medication Sig Dispense Refill     acetaminophen (TYLENOL) 325 MG tablet Take 2 tablets (650 mg total) by mouth every 4 (four) hours as needed. 100 tablet 11     blood glucose meter (GLUCOMETER) Use 1 each As Directed daily. Dispense glucometer brand per patient's insurance at pharmacy discretion. 1 each 0     blood glucose test strips Use 1 each As Directed daily. Dispense brand per patient's insurance at pharmacy discretion. 50 strip 11     ferrous sulfate 325 (65 FE) MG tablet Take 1 tablet (325 mg total) by mouth daily with breakfast. 30 tablet 11     generic lancets (FINGERSTIX LANCETS) Use daily.  Dispense brand per patient's insurance at pharmacy discretion. 50 each 11     metFORMIN (GLUCOPHAGE XR) 500 MG 24 hr tablet Take 3 tablets (1,500 mg total) by mouth daily with breakfast. 90 tablet 11     triamcinolone (KENALOG) 0.1 % cream Apply 1 gram to lower legs 2 times daily 80 g 5     No current facility-administered  "medications for this visit.          Review of Systems  No fever or cough.      Objective    /72   Pulse 73   Temp 98.6  F (37  C) (Oral)   Resp 16   Ht 5' 5.55\" (1.665 m)   Wt 149 lb 8 oz (67.8 kg)   SpO2 99%   BMI 24.46 kg/m    Body mass index is 24.46 kg/m .  Physical Exam  Heart normal  Lungs normal  Feet: no ulcer, normal monofilament testing  Using a cane    A1c 8.4              "

## 2021-06-16 PROBLEM — N18.30 STAGE 3 CHRONIC KIDNEY DISEASE (H): Status: ACTIVE | Noted: 2019-06-06

## 2021-06-16 PROBLEM — A15.9 TUBERCULOSIS: Status: ACTIVE | Noted: 2019-05-31

## 2021-06-16 PROBLEM — M17.0 OSTEOARTHRITIS OF BOTH KNEES, UNSPECIFIED OSTEOARTHRITIS TYPE: Status: ACTIVE | Noted: 2019-11-27

## 2021-06-16 PROBLEM — N18.30 TYPE 2 DIABETES MELLITUS WITH STAGE 3 CHRONIC KIDNEY DISEASE, WITHOUT LONG-TERM CURRENT USE OF INSULIN (H): Status: ACTIVE | Noted: 2019-05-21

## 2021-06-16 PROBLEM — E11.22 TYPE 2 DIABETES MELLITUS WITH STAGE 3 CHRONIC KIDNEY DISEASE, WITHOUT LONG-TERM CURRENT USE OF INSULIN (H): Status: ACTIVE | Noted: 2019-05-21

## 2021-06-16 PROBLEM — D50.0 IRON DEFICIENCY ANEMIA DUE TO CHRONIC BLOOD LOSS: Status: ACTIVE | Noted: 2019-06-06

## 2021-06-16 PROBLEM — A15.0: Status: ACTIVE | Noted: 2019-05-21

## 2021-06-20 NOTE — LETTER
Letter by Alonzo Florez MD at      Author: Alonzo Florez MD Service: -- Author Type: --    Filed:  Encounter Date: 3/28/2020 Status: (Other)         Sak Theh Toe  [address] [place] [address]             March 28, 2020         Hi Sak,    Below are the results from your visit:  Your lab results are good.    Resulted Orders   Comprehensive Metabolic Panel   Result Value Ref Range    Sodium 139 136 - 145 mmol/L    Potassium 3.8 3.5 - 5.0 mmol/L    Chloride 107 98 - 107 mmol/L    CO2 23 22 - 31 mmol/L    Anion Gap, Calculation 9 5 - 18 mmol/L    Glucose 143 (H) 70 - 125 mg/dL    BUN 10 8 - 22 mg/dL    Creatinine 1.30 0.70 - 1.30 mg/dL    GFR MDRD Af Amer >60 >60 mL/min/1.73m2    GFR MDRD Non Af Amer 58 (L) >60 mL/min/1.73m2    Bilirubin, Total 0.4 0.0 - 1.0 mg/dL    Calcium 9.4 8.5 - 10.5 mg/dL    Protein, Total 7.9 6.0 - 8.0 g/dL    Albumin 3.7 3.5 - 5.0 g/dL    Alkaline Phosphatase 174 (H) 45 - 120 U/L    AST 42 (H) 0 - 40 U/L    ALT 44 0 - 45 U/L    Narrative    Fasting Glucose reference range is 70-99 mg/dL per  American Diabetes Association (ADA) guidelines.   Lipid [place] RANDOM   Result Value Ref Range    Cholesterol 115 <=199 mg/dL    Triglycerides 145 <=149 mg/dL    HDL Cholesterol 38 (L) >=40 mg/dL    LDL Calculated 48 <=129 mg/dL    Patient Fasting > 8hrs? No    Glycosylated Hemoglobin A1c   Result Value Ref Range    Hemoglobin A1c 6.7 (H) 3.5 - 6.0 %       Please call with questions or contact us using [facility].    Sincerely,        Electronically signed by Alonzo Florez MD

## 2021-06-21 NOTE — PROGRESS NOTES
"Assessment/Plan:        1. Bilateral knee pain  Exam findings were discussed:     Plan:   - XR Knees Bilateral 1 Or 2 VWS; Future  - XR Knees Bilateral AP Standing; Future  - XR Knees Bilateral 1 Or 2 VWS  - XR Knees Bilateral AP Standing        We will follow-up pending the results of the study to manage accordingly       Subjective:    Patient ID:   Sak Leonidas Mckeon is a 48 y.o. male here to establish care.  He is endorsing pain in both knees for the past 4 years, worse with standing, or when pulling to standing from sitting, and the R>L  H/o broken left lower leg.         Review of Systems  General : negative  Ophthalmic : negative  ENT : negative  Respiratory : no cough, shortness of breath, or wheezing  Cardiovascular : no chest pain or dyspnea on exertion  Gastrointestinal : no abdominal pain, change in bowel habits, or black or bloody stools  Genito-Urinary :  no dysuria, trouble voiding, or hematuria  Musculoskeletal : see HPI   Neurological : negative  Dermatological : negative    Allergy: reviewed  Hematological and Lymphatic : negative  Endocrine : negative     The following patient's history were reviewed and updated as appropriate:   He  has no past medical history on file.  He  has a past surgical history that includes No past surgeries.  His family history includes No Medical Problems in his brother and sister.  He  reports that he has quit smoking. His smoking use included Cigarettes. He has never used smokeless tobacco. He reports that he does not drink alcohol or use illicit drugs..      No outpatient encounter prescriptions on file as of 10/24/2018.     No facility-administered encounter medications on file as of 10/24/2018.          Objective:   /60 (Patient Site: Right Arm, Patient Position: Sitting, Cuff Size: Adult Regular)  Pulse (!) 102  Temp 98.5  F (36.9  C) (Oral)   Ht 5' 6.75\" (1.695 m)  Wt 154 lb 11.2 oz (70.2 kg)  SpO2 99%  BMI 24.41 kg/m2      Physical Exam  General " Appearance:    Alert, well hydrated, no distress,    Eyes:    PERRL, conjunctiva/corneas clear,    Throat:   Lips, mucosa, and tongue normal; teeth and gums normal   Neck:   Supple, symmetrical, trachea midline, no adenopathy;        thyroid:  No enlargement/tenderness/nodules; no carotid    bruit or JVD   Lungs:     Clear to auscultation bilaterally, respirations unlabored   Heart:    Regular rate and rhythm, S1 and S2 normal, no murmur, rub   or gallop   Abdomen:     Soft, non-tender, normal bowel sounds, no rebound or guarding, no masses, no organomegaly   Extremities:   Extremities normal, atraumatic, no cyanosis or edema    palpable tenderness to the medial and later inferior patellae and to the posteior joint line, Down to the lateral calf.      Skin:   Skin color, texture, turgor normal, no rashes or lesions

## 2021-06-22 NOTE — PROGRESS NOTES
Patient was scheduled for a physical today and here with his daughter. However, he is 66 yrs of age born in 1952, but his documentations are stating that he is 48 yrs.  I explained to him for us to conduct a 48 yrs of age physical would be very different than the one for a 66 yrs of age and age appropriate physicals and documentations are prudent.     Patient is advised to correct the age on file, and follow up for an Annual wellness exam.   Patient is understanding and agreeable to the plan of care.     This issue was also discussed with HE administration and agree on the plan of care.

## 2021-06-22 NOTE — PROGRESS NOTES
Assessment/Plan:        1. Osteoarthritis of both knees, unspecified osteoarthritis type  X-ray findings were discussed and pathophysiology and treatment options were reviewed   Recommended NSAIDs and physical therapy as the first step of conservative management.       Plan:   - ibuprofen (ADVIL,MOTRIN) 800 MG tablet; Take 1 tablet (800 mg total) by mouth every 8 (eight) hours as needed for pain.  Dispense: 60 tablet; Refill: 2  - Ambulatory referral to PT/OT     consdier injection/ orthopedic.  Consider topical cream.    Follow up in 4-8 wks.     Total time spent with patient was 25  minutes with >50% of time spent in face-to-face counseling regarding the above plan.          Subjective:    Patient ID:   Jam Mckeon is a 48 y.o. male  here with daughter and , and in follow up of chronic knee pains.   His recent x-ray evaluation revealed chronic osteoarthritic, DJD.  In view of his age, the  states that his real date of birth is in 1952, and he is not 48 yrs of age as states on the chart. I advised to have this discrepancy corrected to avoid future problems.            Objective:   /60 (Patient Site: Right Arm, Patient Position: Sitting, Cuff Size: Adult Regular)   Pulse (!) 111   Temp 98.9  F (37.2  C) (Oral)   Wt 150 lb 11.2 oz (68.4 kg)   SpO2 97%   BMI 23.78 kg/m

## 2021-06-25 NOTE — TELEPHONE ENCOUNTER
RN cannot approve Refill Request    RN can NOT refill this medication   Patient request early refill. Medication last filled 6/30/20 for qty 30 refill 11. Provider to advise on request.   . Last office visit: 2/12/2021 Alonzo Florez MD Last Physical: 5/21/2019 Last MTM visit: Visit date not found Last visit same specialty: 2/12/2021 Alonzo Florez MD.  Next visit within 3 mo: Visit date not found  Next physical within 3 mo: Visit date not found      Adrian David, [facility] Triage/Med Refill 6/9/2021    Requested Prescriptions   Pending Prescriptions Disp Refills     FEROSUL 325 mg (65 mg iron) tablet [Pharmacy Med Name: FERROUS SULFATE 325MG (5GR) TABS] 30 tablet 11     Sig: TAKE 1 TABLET(325 MG) BY MOUTH DAILY WITH BREAKFAST       Iron Supplements Refill Protocol Passed - 6/8/2021  3:53 AM        Passed - PCP or prescribing provider visit in past 12 months       Last office visit with prescriber/PCP: 2/12/2021 Alonzo Florez MD OR same dept: 2/12/2021 Alonzo Florez MD OR same specialty: 2/12/2021 Alonzo Florez MD  Last physical: 5/21/2019 Last MTM visit: Visit date not found   Next visit within 3 mo: Visit date not found  Next physical within 3 mo: Visit date not found  Prescriber OR PCP: Alonzo Florez MD  Last diagnosis associated with med order: 1. Other iron deficiency anemia  - FEROSUL 325 mg (65 mg iron) tablet [Pharmacy Med Name: FERROUS SULFATE 325MG (5GR) TABS]; TAKE 1 TABLET(325 MG) BY MOUTH DAILY WITH BREAKFAST  Dispense: 30 tablet; Refill: 11    If protocol passes may refill for 12 months if within 3 months of last provider visit (or a total of 15 months).             Passed - Hemoglobin or Hematocrit in last 12 months     Hemoglobin   Date Value Ref Range Status   11/11/2020 13.8 (L) 14.0 - 18.0 g/dL Final     Hematocrit   Date Value Ref Range Status   11/11/2020 41.1 40.0 - 54.0 % Final

## 2021-06-26 NOTE — PROGRESS NOTES
"    Assessment & Plan     Jam was seen today for diabetes and test results.    Diagnoses and all orders for this visit:    Type 2 diabetes mellitus with stage 3a chronic kidney disease, without long-term current use of insulin (H)  -     Glycosylated Hemoglobin A1c  -     [facility] RED    Microalbuminuria due to type 2 diabetes mellitus (H)  -     lisinopriL (ZESTRIL) 2.5 MG tablet; Take 1 tablet (2.5 mg total) by mouth daily.    Osteoarthritis of both knees, unspecified osteoarthritis type      Diabetes, well controlled.    Microalbuminuria, not controlled.  I explained how an ACE inhibitor can help to prevent renal damage.  Begin lisinopril.  Potential side effects discussed.    He will contact [facility] to arrange for knee injection.    COVID-19 vaccine was administered today.         BMI:   Estimated body mass index is 25.69 kg/m  as calculated from the following:    Height as of this encounter: 5' 5.55\" (1.665 m).    Weight as of this encounter: 157 lb (71.2 kg).       Return in about 4 months (around 10/11/2021) for DM.    Alonzo Florez MD  [facility]    [place]   [facility] The Toe is 51 y.o. and presents today for the following health issues   HPI     He is taking Metformin for diabetes.  He is eating carefully.    He had colonoscopy in April that demonstrated tubular adenomas.  Repeat in 7 years.    He has knee arthritis.  He had injection in May 2020 at [facility].    He has microalbuminuria.      Current Outpatient Medications   Medication Sig Dispense Refill     acetaminophen (TYLENOL) 325 MG tablet Take 2 tablets (650 mg total) by mouth every 4 (four) hours as needed. 100 tablet 11     blood glucose meter (GLUCOMETER) Use 1 each As Directed daily. Dispense glucometer brand per patient's insurance at pharmacy discretion. 1 each 0     blood glucose test strips Use 1 each As Directed daily. Dispense brand per patient's insurance at pharmacy discretion. 50 strip 11     FEROSUL 325 " "mg (65 mg iron) tablet TAKE 1 TABLET(325 MG) BY MOUTH DAILY WITH BREAKFAST 30 tablet 11     generic lancets (FINGERSTIX LANCETS) Use daily.  Dispense brand per patient's insurance at pharmacy discretion. 50 each 11     metFORMIN (GLUCOPHAGE XR) 500 MG 24 hr tablet Take 3 tablets (1,500 mg total) by mouth daily with breakfast. 90 tablet 11     triamcinolone (KENALOG) 0.1 % cream Apply 1 gram to lower legs 2 times daily 80 g 5     lisinopriL (ZESTRIL) 2.5 MG tablet Take 1 tablet (2.5 mg total) by mouth daily. 90 tablet 3     No current facility-administered medications for this visit.          Review of Systems  No fever or cough.      Objective    /76   Pulse 76   Temp 98.2  F (36.8  C) (Oral)   Resp 16   Ht 5' 5.55\" (1.665 m)   Wt 157 lb (71.2 kg)   SpO2 97% Comment: ra  BMI 25.69 kg/m    Body mass index is 25.69 kg/m .  Physical Exam  Heart normal  Lungs normal  Using a cane.    A1c is 6.0, much improved from 8.4 in February and greater than 14 in November.              "

## 2021-07-01 ENCOUNTER — COMMUNICATION - HEALTHEAST (OUTPATIENT)
Dept: FAMILY MEDICINE | Facility: CLINIC | Age: 51
End: 2021-07-01

## 2021-07-01 DIAGNOSIS — R52 PAIN: ICD-10-CM

## 2021-07-01 RX ORDER — ACETAMINOPHEN 325 MG/1
TABLET ORAL
Qty: 100 TABLET | Refills: 11 | Status: SHIPPED | OUTPATIENT
Start: 2021-07-01 | End: 2021-10-16

## 2021-07-04 NOTE — ADDENDUM NOTE
Addendum Note by Alonzo Florez MD at 6/11/2021  9:00 AM     Author: Alonzo Florez MD Service: -- Author Type: Physician    Filed: 6/15/2021  7:34 PM Encounter Date: 6/11/2021 Status: Signed    : Alonzo Florez MD (Physician)    Addended by: ALONZO FLOREZ on: 6/15/2021 07:34 PM        Modules accepted: Level of Service

## 2021-07-04 NOTE — TELEPHONE ENCOUNTER
Telephone Encounter by Charlotte Trujillo RN at 7/1/2021  7:25 AM     Author: Charlotte Trujillo RN Service: -- Author Type: Registered Nurse    Filed: 7/1/2021  7:25 AM Encounter Date: 7/1/2021 Status: Signed    : Charlotte Trujillo RN (Registered Nurse)       RN cannot approve Refill Request    RN can NOT refill this medication med is not covered by policy/route to provider. Last office visit: 6/11/2021 Alonzo Florez MD Last Physical: 5/21/2019 Last MTM visit: Visit date not found Last visit same specialty: 6/11/2021 Alonzo Florez MD.  Next visit within 3 mo: Visit date not found  Next physical within 3 mo: Visit date not found      [facility] Triage/Med Refill 7/1/2021    Requested Prescriptions   Pending Prescriptions Disp Refills   ? acetaminophen (TYLENOL) 325 MG tablet [Pharmacy Med Name: ACETAMINOPHEN 325MG TABLETS] 100 tablet 11     Sig: TAKE 2 TABLETS(650 MG) BY MOUTH EVERY 4 HOURS AS NEEDED       There is no refill protocol information for this order

## 2021-07-06 VITALS
HEIGHT: 66 IN | WEIGHT: 157 LBS | DIASTOLIC BLOOD PRESSURE: 76 MMHG | OXYGEN SATURATION: 97 % | SYSTOLIC BLOOD PRESSURE: 110 MMHG | RESPIRATION RATE: 16 BRPM | HEART RATE: 76 BPM | BODY MASS INDEX: 25.23 KG/M2 | TEMPERATURE: 98.2 F

## 2021-07-09 ENCOUNTER — AMBULATORY - HEALTHEAST (OUTPATIENT)
Dept: NURSING | Facility: CLINIC | Age: 51
End: 2021-07-09

## 2021-10-02 DIAGNOSIS — L30.9 DERMATITIS: ICD-10-CM

## 2021-10-03 DIAGNOSIS — R52 PAIN: Primary | ICD-10-CM

## 2021-10-03 RX ORDER — TRIAMCINOLONE ACETONIDE 1 MG/G
CREAM TOPICAL
Qty: 80 G | Refills: 5 | Status: SHIPPED | OUTPATIENT
Start: 2021-10-03 | End: 2022-03-31

## 2021-10-03 RX ORDER — ACETAMINOPHEN 325 MG/1
TABLET ORAL
Qty: 100 TABLET | Refills: 0 | Status: SHIPPED | OUTPATIENT
Start: 2021-10-03

## 2021-10-03 NOTE — TELEPHONE ENCOUNTER
"Last Written Prescription Date:  7/1/21  Last Fill Quantity: 100,  # refills: 11   Last office visit provider:  6/11/21     Requested Prescriptions   Pending Prescriptions Disp Refills     acetaminophen (TYLENOL) 325 MG tablet [Pharmacy Med Name: ACETAMINOPHEN 325MG TABLETS] 100 tablet      Sig: TAKE 2 TABLETS(650 MG) BY MOUTH EVERY 4 HOURS AS NEEDED       Analgesics (Non-Narcotic Tylenol and ASA Only) Passed - 10/3/2021  3:51 AM        Passed - Recent (12 mo) or future (30 days) visit within the authorizing provider's specialty     Patient has had an office visit with the authorizing provider or a provider within the authorizing providers department within the previous 12 mos or has a future within next 30 days. See \"Patient Info\" tab in inbasket, or \"Choose Columns\" in Meds & Orders section of the refill encounter.              Passed - Patient is 7 months old or older     If patient is a peds patient of the age 7 mos -12 years, ok to refill using weight-based dosing.     If >3g daily and/or sig is not \"prn\", check for liver enzymes. If normal in the last year, ok to refill.  If not, refer to the provider.          Passed - Medication is active on med list             Pat Dick RN 10/03/21 2:25 PM  "

## 2021-10-03 NOTE — TELEPHONE ENCOUNTER
"Last Written Prescription Date:  11/17/2020  Last Fill Quantity: 80 g,  # refills: 5   Last office visit provider:  6/11/21     Requested Prescriptions   Pending Prescriptions Disp Refills     triamcinolone (KENALOG) 0.1 % external cream [Pharmacy Med Name: TRIAMCINOLONE 0.1% CREAM 80GM] 80 g 5     Sig: APPLY 1 GRAM TO LOWER LEGS TWICE DAILY.       Topical Steroids and Nonsteroidals Protocol Passed - 10/2/2021  3:53 AM        Passed - Patient is age 6 or older        Passed - Authorizing prescriber's most recent note related to this medication read.     If refill request is for ophthalmic use, please forward request to provider for approval.          Passed - High potency steroid not ordered        Passed - Recent (12 mo) or future (30 days) visit within the authorizing provider's specialty     Patient has had an office visit with the authorizing provider or a provider within the authorizing providers department within the previous 12 mos or has a future within next 30 days. See \"Patient Info\" tab in inbasket, or \"Choose Columns\" in Meds & Orders section of the refill encounter.              Passed - Medication is active on med list             Simon Castillo RN 10/03/21 9:26 AM  "

## 2021-10-12 ENCOUNTER — OFFICE VISIT (OUTPATIENT)
Dept: FAMILY MEDICINE | Facility: CLINIC | Age: 51
End: 2021-10-12
Payer: COMMERCIAL

## 2021-10-12 VITALS
BODY MASS INDEX: 26.51 KG/M2 | DIASTOLIC BLOOD PRESSURE: 74 MMHG | TEMPERATURE: 98.3 F | HEART RATE: 85 BPM | OXYGEN SATURATION: 98 % | SYSTOLIC BLOOD PRESSURE: 126 MMHG | WEIGHT: 162 LBS

## 2021-10-12 DIAGNOSIS — E11.22 TYPE 2 DIABETES MELLITUS WITH STAGE 3A CHRONIC KIDNEY DISEASE, WITHOUT LONG-TERM CURRENT USE OF INSULIN (H): Primary | ICD-10-CM

## 2021-10-12 DIAGNOSIS — N18.31 TYPE 2 DIABETES MELLITUS WITH STAGE 3A CHRONIC KIDNEY DISEASE, WITHOUT LONG-TERM CURRENT USE OF INSULIN (H): Primary | ICD-10-CM

## 2021-10-12 LAB
ALBUMIN SERPL-MCNC: 3.9 G/DL (ref 3.5–5)
ALP SERPL-CCNC: 106 U/L (ref 45–120)
ALT SERPL W P-5'-P-CCNC: 165 U/L (ref 0–45)
ANION GAP SERPL CALCULATED.3IONS-SCNC: 12 MMOL/L (ref 5–18)
AST SERPL W P-5'-P-CCNC: 88 U/L (ref 0–40)
BILIRUB SERPL-MCNC: 0.5 MG/DL (ref 0–1)
BUN SERPL-MCNC: 14 MG/DL (ref 8–22)
CALCIUM SERPL-MCNC: 9.5 MG/DL (ref 8.5–10.5)
CHLORIDE BLD-SCNC: 106 MMOL/L (ref 98–107)
CO2 SERPL-SCNC: 22 MMOL/L (ref 22–31)
CREAT SERPL-MCNC: 1.18 MG/DL (ref 0.7–1.3)
GFR SERPL CREATININE-BSD FRML MDRD: 71 ML/MIN/1.73M2
GLUCOSE BLD-MCNC: 145 MG/DL (ref 70–125)
HBA1C MFR BLD: 6 % (ref 0–5.6)
POTASSIUM BLD-SCNC: 4 MMOL/L (ref 3.5–5)
PROT SERPL-MCNC: 8.2 G/DL (ref 6–8)
SODIUM SERPL-SCNC: 140 MMOL/L (ref 136–145)

## 2021-10-12 PROCEDURE — 36415 COLL VENOUS BLD VENIPUNCTURE: CPT | Performed by: FAMILY MEDICINE

## 2021-10-12 PROCEDURE — 80053 COMPREHEN METABOLIC PANEL: CPT | Performed by: FAMILY MEDICINE

## 2021-10-12 PROCEDURE — 83036 HEMOGLOBIN GLYCOSYLATED A1C: CPT | Performed by: FAMILY MEDICINE

## 2021-10-12 PROCEDURE — 99213 OFFICE O/P EST LOW 20 MIN: CPT | Performed by: FAMILY MEDICINE

## 2021-10-17 NOTE — PROGRESS NOTES
"    Assessment & Plan     Type 2 diabetes mellitus with stage 3a chronic kidney disease, without long-term current use of insulin (H)    - Hemoglobin A1c  - Comprehensive metabolic panel (BMP + Alb, Alk Phos, ALT, AST, Total. Bili, TP)  - Comprehensive metabolic panel (BMP + Alb, Alk Phos, ALT, AST, Total. Bili, TP)               BMI:   Estimated body mass index is 26.51 kg/m  as calculated from the following:    Height as of 6/11/21: 1.665 m (5' 5.55\").    Weight as of this encounter: 73.5 kg (162 lb).           Return in about 4 months (around 2/12/2022) for DM.    Alonzo Florez MD, MD  [facility] KARYNA Polk is a 51 year old who presents for the following health issues     HPI     Taking metformin.    [facility] goes to [facility].    Current Outpatient Medications   Medication Sig Dispense Refill     acetaminophen (TYLENOL) 325 MG tablet TAKE 2 TABLETS(650 MG) BY MOUTH EVERY 4 HOURS AS NEEDED 100 tablet 0     blood glucose meter (GLUCOMETER) [BLOOD GLUCOSE METER (GLUCOMETER)] Use 1 each As Directed daily. Dispense glucometer brand per patient's insurance at pharmacy discretion. 1 each 0     blood glucose test strips [BLOOD GLUCOSE TEST STRIPS] Use 1 each As Directed daily. Dispense brand per patient's insurance at pharmacy discretion. 50 strip 11     FEROSUL 325 mg (65 mg iron) tablet [FEROSUL 325 MG (65 MG IRON) TABLET] TAKE 1 TABLET(325 MG) BY MOUTH DAILY WITH BREAKFAST 30 tablet 11     generic lancets (FINGERSTIX LANCETS) [GENERIC LANCETS (FINGERSTIX LANCETS)] Use daily.  Dispense brand per patient's insurance at pharmacy discretion. 50 each 11     lisinopriL (ZESTRIL) 2.5 MG tablet [LISINOPRIL (ZESTRIL) 2.5 MG TABLET] Take 1 tablet (2.5 mg total) by mouth daily. 90 tablet 3     metFORMIN (GLUCOPHAGE XR) 500 MG 24 hr tablet [METFORMIN (GLUCOPHAGE XR) 500 MG 24 HR TABLET] Take 3 tablets (1,500 mg total) by mouth daily with breakfast. 90 tablet 11     triamcinolone (KENALOG) 0.1 % " external cream APPLY 1 GRAM TO LOWER LEGS TWICE DAILY. 80 g 5         Review of Systems   No fever or cough.      Objective    /74 (BP Location: Right arm, Patient Position: Sitting, Cuff Size: Adult Regular)   Pulse 85   Temp 98.3  F (36.8  C) (Oral)   Wt 73.5 kg (162 lb)   SpO2 98%   BMI 26.51 kg/m    Body mass index is 26.51 kg/m .  Physical Exam   Heart normal  Lungs normal  Feet normal    A1c 6.0

## 2021-10-27 ENCOUNTER — TRANSFERRED RECORDS (OUTPATIENT)
Dept: HEALTH INFORMATION MANAGEMENT | Facility: CLINIC | Age: 51
End: 2021-10-27
Payer: COMMERCIAL

## 2021-11-02 ENCOUNTER — TRANSFERRED RECORDS (OUTPATIENT)
Dept: HEALTH INFORMATION MANAGEMENT | Facility: CLINIC | Age: 51
End: 2021-11-02
Payer: COMMERCIAL

## 2021-11-02 LAB — RETINOPATHY: NORMAL

## 2021-11-21 DIAGNOSIS — E11.22 TYPE 2 DIABETES MELLITUS WITH STAGE 3A CHRONIC KIDNEY DISEASE, WITHOUT LONG-TERM CURRENT USE OF INSULIN (H): ICD-10-CM

## 2021-11-21 DIAGNOSIS — N18.31 TYPE 2 DIABETES MELLITUS WITH STAGE 3A CHRONIC KIDNEY DISEASE, WITHOUT LONG-TERM CURRENT USE OF INSULIN (H): ICD-10-CM

## 2021-11-21 DIAGNOSIS — Z76.0 ENCOUNTER FOR MEDICATION REFILL: Primary | ICD-10-CM

## 2021-11-22 RX ORDER — BLOOD SUGAR DIAGNOSTIC
STRIP MISCELLANEOUS
Qty: 50 STRIP | Refills: 11 | Status: SHIPPED | OUTPATIENT
Start: 2021-11-22 | End: 2022-12-24

## 2021-12-08 DIAGNOSIS — Z76.0 ENCOUNTER FOR MEDICATION REFILL: Primary | ICD-10-CM

## 2021-12-08 RX ORDER — LANCETS
EACH MISCELLANEOUS
Qty: 200 EACH | Refills: 3 | Status: SHIPPED | OUTPATIENT
Start: 2021-12-08 | End: 2022-12-24

## 2022-02-04 DIAGNOSIS — N18.31 TYPE 2 DIABETES MELLITUS WITH STAGE 3A CHRONIC KIDNEY DISEASE, WITHOUT LONG-TERM CURRENT USE OF INSULIN (H): ICD-10-CM

## 2022-02-04 DIAGNOSIS — E11.22 TYPE 2 DIABETES MELLITUS WITH STAGE 3A CHRONIC KIDNEY DISEASE, WITHOUT LONG-TERM CURRENT USE OF INSULIN (H): ICD-10-CM

## 2022-02-04 DIAGNOSIS — Z76.0 ENCOUNTER FOR MEDICATION REFILL: Primary | ICD-10-CM

## 2022-02-06 RX ORDER — METFORMIN HCL 500 MG
TABLET, EXTENDED RELEASE 24 HR ORAL
Qty: 90 TABLET | Refills: 11 | Status: SHIPPED | OUTPATIENT
Start: 2022-02-06 | End: 2022-10-18

## 2022-02-15 ENCOUNTER — OFFICE VISIT (OUTPATIENT)
Dept: FAMILY MEDICINE | Facility: CLINIC | Age: 52
End: 2022-02-15
Payer: COMMERCIAL

## 2022-02-15 VITALS
BODY MASS INDEX: 27.98 KG/M2 | WEIGHT: 171 LBS | OXYGEN SATURATION: 97 % | TEMPERATURE: 97.6 F | SYSTOLIC BLOOD PRESSURE: 122 MMHG | HEART RATE: 80 BPM | DIASTOLIC BLOOD PRESSURE: 76 MMHG

## 2022-02-15 DIAGNOSIS — R74.01 ELEVATED AST (SGOT): ICD-10-CM

## 2022-02-15 DIAGNOSIS — Z23 HIGH PRIORITY FOR 2019 NOVEL CORONAVIRUS VACCINATION: ICD-10-CM

## 2022-02-15 DIAGNOSIS — M17.0 OSTEOARTHRITIS OF BOTH KNEES, UNSPECIFIED OSTEOARTHRITIS TYPE: ICD-10-CM

## 2022-02-15 DIAGNOSIS — E11.22 TYPE 2 DIABETES MELLITUS WITH STAGE 3A CHRONIC KIDNEY DISEASE, WITHOUT LONG-TERM CURRENT USE OF INSULIN (H): Primary | ICD-10-CM

## 2022-02-15 DIAGNOSIS — Z11.59 NEED FOR HEPATITIS C SCREENING TEST: ICD-10-CM

## 2022-02-15 DIAGNOSIS — N18.31 TYPE 2 DIABETES MELLITUS WITH STAGE 3A CHRONIC KIDNEY DISEASE, WITHOUT LONG-TERM CURRENT USE OF INSULIN (H): Primary | ICD-10-CM

## 2022-02-15 LAB
ALBUMIN SERPL-MCNC: 3.5 G/DL (ref 3.5–5)
ALP SERPL-CCNC: 110 U/L (ref 45–120)
ALT SERPL W P-5'-P-CCNC: 113 U/L (ref 0–45)
ANION GAP SERPL CALCULATED.3IONS-SCNC: 7 MMOL/L (ref 5–18)
AST SERPL W P-5'-P-CCNC: 59 U/L (ref 0–40)
BILIRUB SERPL-MCNC: 0.3 MG/DL (ref 0–1)
BUN SERPL-MCNC: 16 MG/DL (ref 8–22)
CALCIUM SERPL-MCNC: 9.3 MG/DL (ref 8.5–10.5)
CHLORIDE BLD-SCNC: 108 MMOL/L (ref 98–107)
CHOLEST SERPL-MCNC: 150 MG/DL
CO2 SERPL-SCNC: 24 MMOL/L (ref 22–31)
CREAT SERPL-MCNC: 1.48 MG/DL (ref 0.7–1.3)
FASTING STATUS PATIENT QL REPORTED: ABNORMAL
GFR SERPL CREATININE-BSD FRML MDRD: 56 ML/MIN/1.73M2
GLUCOSE BLD-MCNC: 99 MG/DL (ref 70–125)
HBA1C MFR BLD: 6.9 % (ref 0–5.6)
HBV SURFACE AB SERPLBLD QL IA.RAPID: NEGATIVE
HBV SURFACE AG SERPL QL IA: NONREACTIVE
HDLC SERPL-MCNC: 31 MG/DL
HOLD SPECIMEN: NORMAL
LDLC SERPL CALC-MCNC: 76 MG/DL
POTASSIUM BLD-SCNC: 3.9 MMOL/L (ref 3.5–5)
PROT SERPL-MCNC: 8.2 G/DL (ref 6–8)
SODIUM SERPL-SCNC: 139 MMOL/L (ref 136–145)
TRIGL SERPL-MCNC: 213 MG/DL

## 2022-02-15 PROCEDURE — 86803 HEPATITIS C AB TEST: CPT | Performed by: FAMILY MEDICINE

## 2022-02-15 PROCEDURE — 91306 COVID-19,PF,MODERNA (18+ YRS BOOSTER .25ML): CPT | Performed by: FAMILY MEDICINE

## 2022-02-15 PROCEDURE — 0064A COVID-19,PF,MODERNA (18+ YRS BOOSTER .25ML): CPT | Performed by: FAMILY MEDICINE

## 2022-02-15 PROCEDURE — 86704 HEP B CORE ANTIBODY TOTAL: CPT | Performed by: FAMILY MEDICINE

## 2022-02-15 PROCEDURE — 86706 HEP B SURFACE ANTIBODY: CPT | Performed by: FAMILY MEDICINE

## 2022-02-15 PROCEDURE — 87340 HEPATITIS B SURFACE AG IA: CPT | Performed by: FAMILY MEDICINE

## 2022-02-15 PROCEDURE — 80053 COMPREHEN METABOLIC PANEL: CPT | Performed by: FAMILY MEDICINE

## 2022-02-15 PROCEDURE — 99000 SPECIMEN HANDLING OFFICE-LAB: CPT | Performed by: FAMILY MEDICINE

## 2022-02-15 PROCEDURE — 99214 OFFICE O/P EST MOD 30 MIN: CPT | Performed by: FAMILY MEDICINE

## 2022-02-15 PROCEDURE — 36415 COLL VENOUS BLD VENIPUNCTURE: CPT | Performed by: FAMILY MEDICINE

## 2022-02-15 PROCEDURE — 87902 NFCT AGT GNTYP ALYS HEP C: CPT | Performed by: FAMILY MEDICINE

## 2022-02-15 PROCEDURE — 80061 LIPID PANEL: CPT | Performed by: FAMILY MEDICINE

## 2022-02-15 PROCEDURE — 83036 HEMOGLOBIN GLYCOSYLATED A1C: CPT | Performed by: FAMILY MEDICINE

## 2022-02-15 NOTE — LETTER
April 1, 2022      Sak The Toe  [address] [facility] [address]        Hi Sak,    As we discussed by phone, your hepatitis C test is positive.  I entered a referral to a liver specialist.    Resulted Orders   Hemoglobin A1c   Result Value Ref Range    Hemoglobin A1C 6.9 (H) 0.0 - 5.6 %      Comment:      Normal <5.7%   Prediabetes 5.7-6.4%    Diabetes 6.5% or higher     Note: Adopted from ADA consensus guidelines.   Hepatitis C Screen Reflex to HCV RNA Quant and Genotype   Result Value Ref Range    Hepatitis C Antibody Reactive (A) Nonreactive      Comment:      A reactive result indicates one of the following   1) Current HCV infection   2) Past HCV infection that has resolved or   3) False positivity.     The CDC recommends that a reactive result should be followed by Nucleic acid testing for HCV RNA. If HCV RNA is detected, that indicates current HCV infection.   If HCV RNA is not detected, that indicates either past, resolved HCV infection, or false HCV antibody positivity.    Narrative    Assay performance characteristics have not been established for newborns, infants, and children.   Lipid panel reflex to direct LDL Fasting   Result Value Ref Range    Cholesterol 150 <=199 mg/dL    Triglycerides 213 (H) <=149 mg/dL    Direct Measure HDL 31 (L) >=40 mg/dL      Comment:      HDL Cholesterol Reference Range:     0-2 years:   No reference ranges established for patients under 2 years old  at [facility] for lipid analytes.    2-8 years:  Greater than 45 mg/dL     18 years and older:   Female: Greater than or equal to 50 mg/dL   Male:   Greater than or equal to 40 mg/dL    LDL Cholesterol Calculated 76 <=129 mg/dL    Patient Fasting > 8hrs? Unknown    Comprehensive metabolic panel (BMP + Alb, Alk Phos, ALT, AST, Total. Bili, TP)   Result Value Ref Range    Sodium 139 136 - 145 mmol/L    Potassium 3.9 3.5 - 5.0 mmol/L    Chloride 108 (H) 98 - 107 mmol/L    Carbon Dioxide (CO2) 24 22 - 31 mmol/L     Anion Gap 7 5 - 18 mmol/L    Urea Nitrogen 16 8 - 22 mg/dL    Creatinine 1.48 (H) 0.70 - 1.30 mg/dL    Calcium 9.3 8.5 - 10.5 mg/dL    Glucose 99 70 - 125 mg/dL    Alkaline Phosphatase 110 45 - 120 U/L    AST 59 (H) 0 - 40 U/L     (H) 0 - 45 U/L    Protein Total 8.2 (H) 6.0 - 8.0 g/dL    Albumin 3.5 3.5 - 5.0 g/dL    Bilirubin Total 0.3 0.0 - 1.0 mg/dL    GFR Estimate 56 (L) >60 mL/min/1.73m2      Comment:      Effective December 21, 2021 eGFRcr in adults is calculated using the 2021 CKD-EPI creatinine equation which includes age and gender (Leodan et al., NEJ, DOI: 10.1056/KPPBst8943354)   Hepatitis B Surface Antibody   Result Value Ref Range    Hepatitis B Surface Antibody Negative Negative   Hepatitis B surface antigen   Result Value Ref Range    Hepatitis B Surface Antigen Nonreactive Nonreactive   Hepatitis B core antibody   Result Value Ref Range    Hepatitis B Core Antibody Total Positive (A) Negative   Hepatitis C RNA, Quantitative by PCR with Confirmatory Reflex to Genotyping   Result Value Ref Range    Hepatitis C log 5.0     Hepatitis C RNA IU/mL, Instrument 105,024 (H) <1 IU/mL    Narrative    The GLEN AmpliPrep/GLEN TaqMan HCV test is a FDA-approved in vitro nucleic acid amplification test for the quantitation of HCV DNA in human plasma (EDTA plasma) or serum using the GLEN AmpliPrep instrument for automated viral nucleic acid extraction and the GLEN TaqMan for the automated real-time PCR amplification and detection of viral nucleic acid target. Titer results are reported in International Units/mL (IU/mL) using the 1st WHO International standard for HBV for nucleic acid amplification assays.   Hepatitis C High Resolution Genotype   Result Value Ref Range    Hepatitis C High Resolution Indeterminate       Comment:      Hepatitis C genotyping is indeterminate. This test may be  unsuccessful if the HCV RNA viral load is less than  log 5.0 or 100,000 IU per mL. In addition to low  viral load,  other conditions, such as PCR inhibitors, viral  genetic variation, etc., may cause RT-PCR failure resulting  in an indeterminate result.  INTERPRETIVE INFORMATION: Hepatitis C High Resolution   Genotype     Hepatitis C viral RNA is assayed using reverse   transcription polymerase chain reaction (RT-PCR) to amplify   specific portions of both the Core and NS5B regions of the   viral genome. The amplified nucleic acid is sequenced   bi-directionally using dye-terminator chemistry ([employer]).   Sequencing data is compared to a database of characterized   sequences.     Isolates of hepatitis C virus are grouped into six major   genotypes(1-6). These genotypes are subtyped according to   sequence characteristics. Sequencing both the Core and NS5B   regions allows for subtyping of all confirmed and most   provisional  genotypes, including differentiation of 1a from   1b and typing of genotype 6.    This test was developed and its performance characteristics   determined by [facility]. It has not been cleared or   approved by the US Food and Drug Administration. This test   was performed in a CLIA certified laboratory and is   intended for clinical purposes.  Performed By: [facility]  [address] [place] [id number]  : Glo Thomason MD       If you have any questions or concerns, please call the clinic at the number listed above.       Sincerely,      Alonzo Florez MD

## 2022-02-16 LAB
HBV CORE AB SERPL QL IA: POSITIVE
HCV AB SERPL QL IA: REACTIVE

## 2022-02-17 LAB
HCV RNA SERPL NAA+PROBE-ACNC: ABNORMAL IU/ML
HCV RNA SERPL NAA+PROBE-LOG IU: 5 {LOG_IU}/ML

## 2022-02-18 ENCOUNTER — TRANSFERRED RECORDS (OUTPATIENT)
Dept: HEALTH INFORMATION MANAGEMENT | Facility: CLINIC | Age: 52
End: 2022-02-18
Payer: COMMERCIAL

## 2022-02-23 LAB — HCV GENTYP SERPL NAA+PROBE: NORMAL

## 2022-02-25 NOTE — PROGRESS NOTES
"  Assessment & Plan     Type 2 diabetes mellitus with stage 3a chronic kidney disease, without long-term current use of insulin (H)    Stable.  Continue current medication.    - Lipid panel reflex to direct LDL Fasting  - Albumin Random Urine Quantitative with Creat Ratio  - Hemoglobin A1c  - Lipid panel reflex to direct LDL Fasting    Need for hepatitis C screening test    - Hepatitis C Screen Reflex to HCV RNA Quant and Genotype  - Hepatitis C Screen Reflex to HCV RNA Quant and Genotype  - Hepatitis C RNA, Quantitative by PCR with Confirmatory Reflex to Genotyping  - Hepatitis C High Resolution Genotype    High priority for 2019 novel coronavirus vaccination    - COVID-19,PF,MODERNA (18+ YRS BOOSTER .25ML)    Elevated AST (SGOT)    Check hepatitis studies.    - Comprehensive metabolic panel (BMP + Alb, Alk Phos, ALT, AST, Total. Bili, TP)  - Hepatitis B Surface Antibody  - Hepatitis B surface antigen  - Hepatitis B core antibody  - Comprehensive metabolic panel (BMP + Alb, Alk Phos, ALT, AST, Total. Bili, TP)  - Hepatitis B Surface Antibody  - Hepatitis B surface antigen  - Hepatitis B core antibody    Osteoarthritis of both knees, unspecified osteoarthritis type    Continue to see Ortho.      30 minutes spent on the date of the encounter doing chart review, review of test results and patient visit        BMI:   Estimated body mass index is 27.98 kg/m  as calculated from the following:    Height as of 6/11/21: 1.665 m (5' 5.55\").    Weight as of this encounter: 77.6 kg (171 lb).           Return in about 4 months (around 6/15/2022) for DM.    Alonzo Florez MD, MD  [facility] is [facility] 52 [facility] who presents for the following health issues     HPI     Diabetes follow-up.  Taking metformin.    Weight increased 11 # since last visit.    Prior AST 88, .    He goes to Ortho for knees.  Steroid injections help, but have not been lasting as long.      Current Outpatient " Medications   Medication Sig Dispense Refill     ACCU-CHEK GUIDE test strip TEST DAILY 50 strip 11     acetaminophen (TYLENOL) 325 MG tablet TAKE 2 TABLETS(650 MG) BY MOUTH EVERY 4 HOURS AS NEEDED 100 tablet 0     blood glucose meter (GLUCOMETER) [BLOOD GLUCOSE METER (GLUCOMETER)] Use 1 each As Directed daily. Dispense glucometer brand per patient's insurance at pharmacy discretion. 1 each 0     blood glucose monitoring (SOFTCLIX) lancets TEST DAILY 200 each 3     FEROSUL 325 mg (65 mg iron) tablet [FEROSUL 325 MG (65 MG IRON) TABLET] TAKE 1 TABLET(325 MG) BY MOUTH DAILY WITH BREAKFAST 30 tablet 11     generic lancets (FINGERSTIX LANCETS) [GENERIC LANCETS (FINGERSTIX LANCETS)] Use daily.  Dispense brand per patient's insurance at pharmacy discretion. 50 each 11     lisinopriL (ZESTRIL) 2.5 MG tablet [LISINOPRIL (ZESTRIL) 2.5 MG TABLET] Take 1 tablet (2.5 mg total) by mouth daily. 90 tablet 3     metFORMIN (GLUCOPHAGE-XR) 500 MG 24 hr tablet TAKE 3 TABLETS(1500 MG) BY MOUTH DAILY WITH BREAKFAST 90 tablet 11     triamcinolone (KENALOG) 0.1 % external cream APPLY 1 GRAM TO LOWER LEGS TWICE DAILY. 80 g 5         Review of Systems   No fever or cough.      Objective    /76   Pulse 80   Temp 97.6  F (36.4  C) (Oral)   Wt 77.6 kg (171 lb)   SpO2 97%   BMI 27.98 kg/m    Body mass index is 27.98 kg/m .  Physical Exam   Heart normal  Lungs normal  Abdomen normal  Using a cane    A1c 6.9, had been 6.0

## 2022-03-30 DIAGNOSIS — L30.9 DERMATITIS: ICD-10-CM

## 2022-03-30 DIAGNOSIS — Z76.0 ENCOUNTER FOR MEDICATION REFILL: Primary | ICD-10-CM

## 2022-03-31 RX ORDER — TRIAMCINOLONE ACETONIDE 1 MG/G
CREAM TOPICAL
Qty: 80 G | Refills: 5 | Status: SHIPPED | OUTPATIENT
Start: 2022-03-31 | End: 2022-12-21

## 2022-04-01 ENCOUNTER — TELEPHONE (OUTPATIENT)
Dept: FAMILY MEDICINE | Facility: CLINIC | Age: 52
End: 2022-04-01
Payer: COMMERCIAL

## 2022-04-01 DIAGNOSIS — B19.20 HEPATITIS C VIRUS INFECTION WITHOUT HEPATIC COMA, UNSPECIFIED CHRONICITY: Primary | ICD-10-CM

## 2022-04-02 NOTE — TELEPHONE ENCOUNTER
I called pt with [place] .  His daughter was on speakerphone with him.  I explained lab result of hepatitis C.  I recommend referral to GI hepatology.  He agrees.  Also noted: hep B core antibody positive means that he was exposed to Hepatitis B.  Hep B surface antibody and antigen were both negative, so it is not known if there might be a small number of virus particles present.  He has not used alcohol since 2018.  shola

## 2022-05-28 DIAGNOSIS — D50.8 OTHER IRON DEFICIENCY ANEMIA: ICD-10-CM

## 2022-05-29 DIAGNOSIS — R80.9 MICROALBUMINURIA DUE TO TYPE 2 DIABETES MELLITUS (H): ICD-10-CM

## 2022-05-29 DIAGNOSIS — E11.29 MICROALBUMINURIA DUE TO TYPE 2 DIABETES MELLITUS (H): ICD-10-CM

## 2022-05-30 RX ORDER — FERROUS SULFATE 325(65) MG
TABLET ORAL
Qty: 30 TABLET | Refills: 11 | Status: SHIPPED | OUTPATIENT
Start: 2022-05-30 | End: 2023-06-21

## 2022-05-30 NOTE — TELEPHONE ENCOUNTER
"Routing refill request to provider for review/approval because:  Labs out of range:  Hgb  Labs not current:  Hgb, HCT    Last Written Prescription Date:  6/9/2021  Last Fill Quantity: 30,  # refills: 11   Last office visit provider:  2/15/2022     Requested Prescriptions   Pending Prescriptions Disp Refills     FEROSUL 325 (65 Fe) MG tablet [Pharmacy Med Name: FERROUS SULFATE 325MG (5GR) TABS] 30 tablet 11     Sig: TAKE 1 TABLET(325 MG) BY MOUTH DAILY WITH BREAKFAST       Iron Supplements Failed - 5/30/2022  7:36 AM        Failed - Hgb OR Hct on record within the past 12 mos.     Patient need only have had a HGB or HCT on file in the past 12 mos. That result does not need to be normal.    Recent Labs   Lab Test 11/11/20  0023 11/09/20  1332 06/30/20  1019   HGB 13.8* 14.1 10.9*     Recent Labs   Lab Test 11/11/20  0023 06/26/19  0705 06/17/19  0702   HCT 41.1 30.0* 28.4*       Please verify a HGB or HCT has been checked SINCE THE LAST DOSE CHANGE.            Passed - Patient is 12 years of age or older        Passed - Recent (12 mo) or future (30 days) visit within the authorizing provider's specialty     Patient has had an office visit with the authorizing provider or a provider within the authorizing providers department within the previous 12 mos or has a future within next 30 days. See \"Patient Info\" tab in inbasket, or \"Choose Columns\" in Meds & Orders section of the refill encounter.              Passed - Medication is active on med list             Brianda Stanley RN 05/30/22 7:36 AM  "

## 2022-05-31 NOTE — TELEPHONE ENCOUNTER
"Routing refill request to provider for review/approval because:  Labs out of range:  Creatinine    Last Written Prescription Date:  6/11/21  Last Fill Quantity: 90,  # refills: 3   Last office visit provider:  2/15/22     Requested Prescriptions   Pending Prescriptions Disp Refills     lisinopril (ZESTRIL) 2.5 MG tablet [Pharmacy Med Name: LISINOPRIL 2.5MG TABLETS] 90 tablet 3     Sig: TAKE 1 TABLET(2.5 MG) BY MOUTH DAILY       ACE Inhibitors (Including Combos) Protocol Failed - 5/29/2022  3:52 AM        Failed - Normal serum creatinine on file in past 12 months     Recent Labs   Lab Test 02/15/22  0914   CR 1.48*       Ok to refill medication if creatinine is low          Passed - Blood pressure under 140/90 in past 12 months     BP Readings from Last 3 Encounters:   02/15/22 122/76   10/12/21 126/74   06/11/21 110/76                 Passed - Recent (12 mo) or future (30 days) visit within the authorizing provider's specialty     Patient has had an office visit with the authorizing provider or a provider within the authorizing providers department within the previous 12 mos or has a future within next 30 days. See \"Patient Info\" tab in inbasket, or \"Choose Columns\" in Meds & Orders section of the refill encounter.              Passed - Medication is active on med list        Passed - Patient is age 18 or older        Passed - Normal serum potassium on file in past 12 months     Recent Labs   Lab Test 02/15/22  0914   POTASSIUM 3.9                  Nancy Molina RN 05/31/22 7:17 AM  "

## 2022-06-01 RX ORDER — LISINOPRIL 2.5 MG/1
TABLET ORAL
Qty: 90 TABLET | Refills: 3 | Status: SHIPPED | OUTPATIENT
Start: 2022-06-01 | End: 2023-05-24

## 2022-06-03 ENCOUNTER — TRANSFERRED RECORDS (OUTPATIENT)
Dept: HEALTH INFORMATION MANAGEMENT | Facility: CLINIC | Age: 52
End: 2022-06-03
Payer: COMMERCIAL

## 2022-06-03 LAB
ALT SERPL-CCNC: 86 IU/L (ref 0–44)
AST SERPL-CCNC: 50 IU/L (ref 0–40)
INR (EXTERNAL): 1 (ref 0.9–1.2)

## 2022-06-16 ENCOUNTER — HOSPITAL ENCOUNTER (OUTPATIENT)
Dept: ULTRASOUND IMAGING | Facility: HOSPITAL | Age: 52
Discharge: HOME | End: 2022-06-16
Attending: PHYSICIAN ASSISTANT | Admitting: PHYSICIAN ASSISTANT
Payer: COMMERCIAL

## 2022-06-16 DIAGNOSIS — B18.2 CHRONIC HEPATITIS C WITHOUT HEPATIC COMA (H): ICD-10-CM

## 2022-06-16 PROCEDURE — 76700 US EXAM ABDOM COMPLETE: CPT

## 2022-06-17 ENCOUNTER — OFFICE VISIT (OUTPATIENT)
Dept: FAMILY MEDICINE | Facility: CLINIC | Age: 52
End: 2022-06-17
Payer: COMMERCIAL

## 2022-06-17 ENCOUNTER — TRANSFERRED RECORDS (OUTPATIENT)
Dept: HEALTH INFORMATION MANAGEMENT | Facility: CLINIC | Age: 52
End: 2022-06-17

## 2022-06-17 VITALS
BODY MASS INDEX: 26.68 KG/M2 | WEIGHT: 166 LBS | OXYGEN SATURATION: 97 % | TEMPERATURE: 97.5 F | HEIGHT: 66 IN | DIASTOLIC BLOOD PRESSURE: 72 MMHG | SYSTOLIC BLOOD PRESSURE: 118 MMHG | HEART RATE: 78 BPM

## 2022-06-17 DIAGNOSIS — N18.31 TYPE 2 DIABETES MELLITUS WITH STAGE 3A CHRONIC KIDNEY DISEASE, WITHOUT LONG-TERM CURRENT USE OF INSULIN (H): Primary | ICD-10-CM

## 2022-06-17 DIAGNOSIS — R05.9 COUGH: ICD-10-CM

## 2022-06-17 DIAGNOSIS — E11.22 TYPE 2 DIABETES MELLITUS WITH STAGE 3A CHRONIC KIDNEY DISEASE, WITHOUT LONG-TERM CURRENT USE OF INSULIN (H): Primary | ICD-10-CM

## 2022-06-17 DIAGNOSIS — Z23 HIGH PRIORITY FOR 2019 NOVEL CORONAVIRUS VACCINATION: ICD-10-CM

## 2022-06-17 LAB
CREAT UR-MCNC: 90 MG/DL
HBA1C MFR BLD: 6.7 % (ref 0–5.6)
HGB BLD-MCNC: 14.7 G/DL (ref 13.3–17.7)
MICROALBUMIN UR-MCNC: 182.72 MG/DL (ref 0–1.99)
MICROALBUMIN/CREAT UR: 2030.2 MG/G CR

## 2022-06-17 PROCEDURE — 36415 COLL VENOUS BLD VENIPUNCTURE: CPT | Performed by: FAMILY MEDICINE

## 2022-06-17 PROCEDURE — 82043 UR ALBUMIN QUANTITATIVE: CPT | Performed by: FAMILY MEDICINE

## 2022-06-17 PROCEDURE — 0064A COVID-19,PF,MODERNA (18+ YRS BOOSTER .25ML): CPT | Performed by: FAMILY MEDICINE

## 2022-06-17 PROCEDURE — 85018 HEMOGLOBIN: CPT | Performed by: FAMILY MEDICINE

## 2022-06-17 PROCEDURE — 99214 OFFICE O/P EST MOD 30 MIN: CPT | Mod: 25 | Performed by: FAMILY MEDICINE

## 2022-06-17 PROCEDURE — 91306 COVID-19,PF,MODERNA (18+ YRS BOOSTER .25ML): CPT | Performed by: FAMILY MEDICINE

## 2022-06-17 PROCEDURE — 83036 HEMOGLOBIN GLYCOSYLATED A1C: CPT | Performed by: FAMILY MEDICINE

## 2022-06-17 RX ORDER — AZITHROMYCIN 250 MG/1
TABLET, FILM COATED ORAL
Qty: 6 TABLET | Refills: 0 | Status: SHIPPED | OUTPATIENT
Start: 2022-06-17 | End: 2022-06-22

## 2022-06-17 NOTE — PROGRESS NOTES
"  Assessment & Plan     Type 2 diabetes mellitus with stage 3a chronic kidney disease, without long-term current use of insulin (H)    - Hemoglobin  - Hemoglobin A1c  - Hemoglobin  - Hemoglobin A1c  - Albumin Random Urine Quantitative with Creat Ratio    High priority for 2019 novel coronavirus vaccination    - COVID-19,PF,MODERNA (18+ YRS BOOSTER .25ML)    Cough    - azithromycin (ZITHROMAX) 250 MG tablet  Dispense: 6 tablet; Refill: 0               BMI:   Estimated body mass index is 27.16 kg/m  as calculated from the following:    Height as of this encounter: 1.665 m (5' 5.55\").    Weight as of this encounter: 75.3 kg (166 lb).           Return in about 4 months (around 10/17/2022) for DM.    Alonzo Florez MD, MD  [facility] KARYNA Polk is a 52 year old, presenting for the following health issues:  No chief complaint on file.      History of Present Illness       Diabetes:   He presents for follow up of diabetes.  He is checking home blood glucose two times daily. He checks blood glucose before meals.  Blood glucose is never over 200 and never under 70. He is aware of hypoglycemia symptoms including other. He has no concerns regarding his diabetes at this time.  He is having numbness in feet and blurry vision.         He eats 2-3 servings of fruits and vegetables daily.He consumes 0 sweetened beverage(s) daily.He exercises with enough effort to increase his heart rate 9 or less minutes per day.  He exercises with enough effort to increase his heart rate 3 or less days per week.   He is taking medications regularly.           Review of Systems         Objective    /72 (Cuff Size: Adult Regular)   Pulse 78   Temp 97.5  F (36.4  C)   Ht 1.665 m (5' 5.55\")   Wt 75.3 kg (166 lb)   SpO2 97%   BMI 27.16 kg/m    Body mass index is 27.16 kg/m .  Physical Exam   Heart normal                .  ..  "

## 2022-06-28 ENCOUNTER — TRANSFERRED RECORDS (OUTPATIENT)
Dept: HEALTH INFORMATION MANAGEMENT | Facility: CLINIC | Age: 52
End: 2022-06-28

## 2022-07-08 ENCOUNTER — TRANSFERRED RECORDS (OUTPATIENT)
Dept: HEALTH INFORMATION MANAGEMENT | Facility: CLINIC | Age: 52
End: 2022-07-08

## 2022-09-13 ENCOUNTER — TRANSFERRED RECORDS (OUTPATIENT)
Dept: HEALTH INFORMATION MANAGEMENT | Facility: CLINIC | Age: 52
End: 2022-09-13

## 2022-09-13 LAB — HEP C HIM: NORMAL

## 2022-10-18 ENCOUNTER — OFFICE VISIT (OUTPATIENT)
Dept: FAMILY MEDICINE | Facility: CLINIC | Age: 52
End: 2022-10-18
Payer: COMMERCIAL

## 2022-10-18 VITALS
TEMPERATURE: 98.6 F | OXYGEN SATURATION: 98 % | HEART RATE: 84 BPM | WEIGHT: 167.5 LBS | DIASTOLIC BLOOD PRESSURE: 76 MMHG | SYSTOLIC BLOOD PRESSURE: 126 MMHG | BODY MASS INDEX: 27.41 KG/M2

## 2022-10-18 DIAGNOSIS — E11.22 TYPE 2 DIABETES MELLITUS WITH STAGE 3A CHRONIC KIDNEY DISEASE, WITHOUT LONG-TERM CURRENT USE OF INSULIN (H): Primary | ICD-10-CM

## 2022-10-18 DIAGNOSIS — Z23 HIGH PRIORITY FOR 2019 NOVEL CORONAVIRUS VACCINATION: ICD-10-CM

## 2022-10-18 DIAGNOSIS — Z23 IMMUNIZATION DUE: ICD-10-CM

## 2022-10-18 DIAGNOSIS — N18.31 TYPE 2 DIABETES MELLITUS WITH STAGE 3A CHRONIC KIDNEY DISEASE, WITHOUT LONG-TERM CURRENT USE OF INSULIN (H): Primary | ICD-10-CM

## 2022-10-18 DIAGNOSIS — Z23 NEED FOR IMMUNIZATION AGAINST INFLUENZA: ICD-10-CM

## 2022-10-18 LAB — HBA1C MFR BLD: 7.1 % (ref 0–5.6)

## 2022-10-18 PROCEDURE — 90677 PCV20 VACCINE IM: CPT | Performed by: FAMILY MEDICINE

## 2022-10-18 PROCEDURE — 83036 HEMOGLOBIN GLYCOSYLATED A1C: CPT | Performed by: FAMILY MEDICINE

## 2022-10-18 PROCEDURE — 99214 OFFICE O/P EST MOD 30 MIN: CPT | Mod: 25 | Performed by: FAMILY MEDICINE

## 2022-10-18 PROCEDURE — 90472 IMMUNIZATION ADMIN EACH ADD: CPT | Performed by: FAMILY MEDICINE

## 2022-10-18 PROCEDURE — 91313 COVID-19,PF,MODERNA BIVALENT: CPT | Performed by: FAMILY MEDICINE

## 2022-10-18 PROCEDURE — 0134A COVID-19,PF,MODERNA BIVALENT: CPT | Performed by: FAMILY MEDICINE

## 2022-10-18 PROCEDURE — 90471 IMMUNIZATION ADMIN: CPT | Performed by: FAMILY MEDICINE

## 2022-10-18 PROCEDURE — 36415 COLL VENOUS BLD VENIPUNCTURE: CPT | Performed by: FAMILY MEDICINE

## 2022-10-18 PROCEDURE — 90682 RIV4 VACC RECOMBINANT DNA IM: CPT | Performed by: FAMILY MEDICINE

## 2022-10-18 RX ORDER — METFORMIN HCL 500 MG
1000 TABLET, EXTENDED RELEASE 24 HR ORAL DAILY
Qty: 60 TABLET | Refills: 11 | Status: SHIPPED | OUTPATIENT
Start: 2022-10-18 | End: 2023-10-18

## 2022-10-18 NOTE — PROGRESS NOTES
"  Assessment & Plan     Type 2 diabetes mellitus with stage 3a chronic kidney disease, without long-term current use of insulin (H)    Decrease metformin to 2 per day.    - Hemoglobin A1c  - Hemoglobin A1c  - metFORMIN (GLUCOPHAGE XR) 500 MG 24 hr tablet  Dispense: 60 tablet; Refill: 11    Need for immunization against influenza    - INFLUENZA QUAD, RECOMBINANT, P-FREE (RIV4) (FLUBLOK) AGE 50-64 [PYE451]    Immunization due    - PNEUMOCOCCAL 20 VALENT CONJUGATE (PREVNAR 20)    High priority for 2019 novel coronavirus vaccination    - COVID-19,PF,MODERNA BIVALENT 18+Yrs      Prescription drug management         BMI:   Estimated body mass index is 27.41 kg/m  as calculated from the following:    Height as of 6/17/22: 1.665 m (5' 5.55\").    Weight as of this encounter: 76 kg (167 lb 8 oz).           Return in about 4 months (around 2/18/2023).    Alonzo Florez MD  [facility] KARYNA Polk is a 52 year old, presenting for the following health issues:  Diabetes      History of Present Illness       Diabetes:   He presents for follow up of diabetes.  He is checking home blood glucose two times daily. He checks blood glucose before meals and at bedtime.  Blood glucose is never over 200 and sometimes under 70. He is aware of hypoglycemia symptoms including shakiness and other. He has no concerns regarding his diabetes at this time.  He is having burning in feet and blurry vision. The patient has had a diabetic eye exam in the last 12 months. Eye exam performed on 11/02/2021. Location of last eye exam [facility].    He exercises with enough effort to increase his heart rate 9 or less minutes per day.  He exercises with enough effort to increase his heart rate 3 or less days per week.   He is taking medications regularly.       Glucose down to 60,70.    He has been on a new medication from GI for 1 week.    Knee - Dec.    Current Outpatient Medications   Medication Sig Dispense Refill     " metFORMIN (GLUCOPHAGE XR) 500 MG 24 hr tablet Take 2 tablets (1,000 mg) by mouth daily 60 tablet 11     ACCU-CHEK GUIDE test strip TEST DAILY 50 strip 11     acetaminophen (TYLENOL) 325 MG tablet TAKE 2 TABLETS(650 MG) BY MOUTH EVERY 4 HOURS AS NEEDED 100 tablet 0     blood glucose meter (GLUCOMETER) [BLOOD GLUCOSE METER (GLUCOMETER)] Use 1 each As Directed daily. Dispense glucometer brand per patient's insurance at pharmacy discretion. 1 each 0     blood glucose monitoring (SOFTCLIX) lancets TEST DAILY 200 each 3     FEROSUL 325 (65 Fe) MG tablet TAKE 1 TABLET(325 MG) BY MOUTH DAILY WITH BREAKFAST 30 tablet 11     generic lancets (FINGERSTIX LANCETS) [GENERIC LANCETS (FINGERSTIX LANCETS)] Use daily.  Dispense brand per patient's insurance at pharmacy discretion. 50 each 11     lisinopril (ZESTRIL) 2.5 MG tablet TAKE 1 TABLET(2.5 MG) BY MOUTH DAILY 90 tablet 3     triamcinolone (KENALOG) 0.1 % external cream APPLY 1 GRAM TO LOWER LEGS TWICE DAILY. 80 g 5         Review of Systems   No fever or cough.      Objective    /76 (BP Location: Left arm, Cuff Size: Adult Regular)   Pulse 84   Temp 98.6  F (37  C) (Oral)   Wt 76 kg (167 lb 8 oz)   SpO2 98%   BMI 27.41 kg/m    Body mass index is 27.41 kg/m .  Physical Exam   Heart normal  Lungs normal  Feet normal  Using a cane    A1c 7.1

## 2022-12-12 ENCOUNTER — TRANSFERRED RECORDS (OUTPATIENT)
Dept: HEALTH INFORMATION MANAGEMENT | Facility: CLINIC | Age: 52
End: 2022-12-12

## 2022-12-20 DIAGNOSIS — Z76.0 ENCOUNTER FOR MEDICATION REFILL: ICD-10-CM

## 2022-12-20 DIAGNOSIS — L30.9 DERMATITIS: ICD-10-CM

## 2022-12-21 RX ORDER — TRIAMCINOLONE ACETONIDE 1 MG/G
CREAM TOPICAL
Qty: 80 G | Refills: 5 | Status: SHIPPED | OUTPATIENT
Start: 2022-12-20

## 2022-12-21 NOTE — TELEPHONE ENCOUNTER
"Last Written Prescription Date:  3/31/2022  Last Fill Quantity: 80 g,  # refills: 5   Last office visit provider:  10/18/2022     Requested Prescriptions   Pending Prescriptions Disp Refills     triamcinolone (KENALOG) 0.1 % external cream [Pharmacy Med Name: TRIAMCINOLONE 0.1% CREAM 80GM] 80 g 5     Sig: APPLY 1 GRAM TOPICALLY TO LOWER LEGS TWICE DAILY       Topical Steroids and Nonsteroidals Protocol Passed - 12/20/2022 11:14 PM        Passed - Patient is age 6 or older        Passed - Authorizing prescriber's most recent note related to this medication read.     If refill request is for ophthalmic use, please forward request to provider for approval.          Passed - High potency steroid not ordered        Passed - Recent (12 mo) or future (30 days) visit within the authorizing provider's specialty     Patient has had an office visit with the authorizing provider or a provider within the authorizing providers department within the previous 12 mos or has a future within next 30 days. See \"Patient Info\" tab in inbasket, or \"Choose Columns\" in Meds & Orders section of the refill encounter.              Passed - Medication is active on med list             Katelyn Teresa RN 12/20/22 11:18 PM  "

## 2022-12-23 DIAGNOSIS — Z76.0 ENCOUNTER FOR MEDICATION REFILL: ICD-10-CM

## 2022-12-24 RX ORDER — BLOOD SUGAR DIAGNOSTIC
STRIP MISCELLANEOUS
Qty: 50 STRIP | Refills: 11 | Status: SHIPPED | OUTPATIENT
Start: 2022-12-24 | End: 2024-02-02

## 2022-12-24 NOTE — TELEPHONE ENCOUNTER
"Last Written Prescription Date:  11/22/21  Last Fill Quantity: 50,  # refills: 11   Last office visit provider:  10/18/22     Requested Prescriptions   Pending Prescriptions Disp Refills     ACCU-CHEK GUIDE test strip [Pharmacy Med Name: ACCU-CHEK GUIDE TEST STRIPS 50] 50 strip 11     Sig: TEST DAILY       Diabetic Supplies Protocol Passed - 12/23/2022  3:51 AM        Passed - Medication is active on med list        Passed - Patient is 18 years of age or older        Passed - Recent (6 mo) or future (30 days) visit within the authorizing provider's specialty     Patient had office visit in the last 6 months or has a visit in the next 30 days with authorizing provider.  See \"Patient Info\" tab in inbasket, or \"Choose Columns\" in Meds & Orders section of the refill encounter.                 Elen Acosta RN 12/24/22 2:28 PM  "

## 2022-12-24 NOTE — TELEPHONE ENCOUNTER
"Routing refill request to provider for review/approval because:  Previous Rx does not have a note on how frequently the patient should test.     Last Written Prescription:      Last office visit provider:  10/18/22    Requested Prescriptions   Pending Prescriptions Disp Refills     blood glucose monitoring (SOFTCLIX) lancets [Pharmacy Med Name: SOFTCLIX LANCETS]       Sig: TEST DAILY       Diabetic Supplies Protocol Passed - 12/23/2022  3:51 AM        Passed - Medication is active on med list        Passed - Patient is 18 years of age or older        Passed - Recent (6 mo) or future (30 days) visit within the authorizing provider's specialty     Patient had office visit in the last 6 months or has a visit in the next 30 days with authorizing provider.  See \"Patient Info\" tab in inbasket, or \"Choose Columns\" in Meds & Orders section of the refill encounter.                 Mikayla Simon RN 12/24/22 2:52 PM  "

## 2022-12-26 RX ORDER — LANCETS
1 EACH MISCELLANEOUS DAILY
Qty: 100 EACH | Refills: 3 | Status: SHIPPED | OUTPATIENT
Start: 2022-12-26

## 2023-01-16 ENCOUNTER — TRANSFERRED RECORDS (OUTPATIENT)
Dept: HEALTH INFORMATION MANAGEMENT | Facility: CLINIC | Age: 53
End: 2023-01-16
Payer: COMMERCIAL

## 2023-02-09 ENCOUNTER — TELEPHONE (OUTPATIENT)
Dept: FAMILY MEDICINE | Facility: CLINIC | Age: 53
End: 2023-02-09
Payer: COMMERCIAL

## 2023-02-10 ENCOUNTER — OFFICE VISIT (OUTPATIENT)
Dept: FAMILY MEDICINE | Facility: CLINIC | Age: 53
End: 2023-02-10
Payer: COMMERCIAL

## 2023-02-10 VITALS
BODY MASS INDEX: 27.28 KG/M2 | TEMPERATURE: 98.4 F | WEIGHT: 169.75 LBS | HEART RATE: 93 BPM | OXYGEN SATURATION: 98 % | RESPIRATION RATE: 16 BRPM | DIASTOLIC BLOOD PRESSURE: 83 MMHG | HEIGHT: 66 IN | SYSTOLIC BLOOD PRESSURE: 127 MMHG

## 2023-02-10 DIAGNOSIS — N18.31 TYPE 2 DIABETES MELLITUS WITH STAGE 3A CHRONIC KIDNEY DISEASE, WITHOUT LONG-TERM CURRENT USE OF INSULIN (H): ICD-10-CM

## 2023-02-10 DIAGNOSIS — E78.2 MIXED HYPERLIPIDEMIA: ICD-10-CM

## 2023-02-10 DIAGNOSIS — Z01.818 PREOPERATIVE EXAMINATION: Primary | ICD-10-CM

## 2023-02-10 DIAGNOSIS — M17.11 PRIMARY OSTEOARTHRITIS OF RIGHT KNEE: ICD-10-CM

## 2023-02-10 DIAGNOSIS — E11.22 TYPE 2 DIABETES MELLITUS WITH STAGE 3A CHRONIC KIDNEY DISEASE, WITHOUT LONG-TERM CURRENT USE OF INSULIN (H): ICD-10-CM

## 2023-02-10 LAB
ANION GAP SERPL CALCULATED.3IONS-SCNC: 15 MMOL/L (ref 7–15)
BUN SERPL-MCNC: 15 MG/DL (ref 6–20)
CALCIUM SERPL-MCNC: 9.6 MG/DL (ref 8.6–10)
CHLORIDE SERPL-SCNC: 103 MMOL/L (ref 98–107)
CHOLEST SERPL-MCNC: 211 MG/DL
CREAT SERPL-MCNC: 1.42 MG/DL (ref 0.67–1.17)
DEPRECATED HCO3 PLAS-SCNC: 21 MMOL/L (ref 22–29)
GFR SERPL CREATININE-BSD FRML MDRD: 59 ML/MIN/1.73M2
GLUCOSE SERPL-MCNC: 133 MG/DL (ref 70–99)
HBA1C MFR BLD: 7.3 % (ref 0–5.6)
HDLC SERPL-MCNC: 44 MG/DL
HGB BLD-MCNC: 15.7 G/DL (ref 13.3–17.7)
LDLC SERPL CALC-MCNC: 138 MG/DL
NONHDLC SERPL-MCNC: 167 MG/DL
POTASSIUM SERPL-SCNC: 4.1 MMOL/L (ref 3.4–5.3)
SODIUM SERPL-SCNC: 139 MMOL/L (ref 136–145)
TRIGL SERPL-MCNC: 146 MG/DL

## 2023-02-10 PROCEDURE — 83036 HEMOGLOBIN GLYCOSYLATED A1C: CPT | Performed by: FAMILY MEDICINE

## 2023-02-10 PROCEDURE — 80048 BASIC METABOLIC PNL TOTAL CA: CPT | Performed by: FAMILY MEDICINE

## 2023-02-10 PROCEDURE — 93005 ELECTROCARDIOGRAM TRACING: CPT | Performed by: FAMILY MEDICINE

## 2023-02-10 PROCEDURE — 36415 COLL VENOUS BLD VENIPUNCTURE: CPT | Performed by: FAMILY MEDICINE

## 2023-02-10 PROCEDURE — 80061 LIPID PANEL: CPT | Performed by: FAMILY MEDICINE

## 2023-02-10 PROCEDURE — 99214 OFFICE O/P EST MOD 30 MIN: CPT | Performed by: FAMILY MEDICINE

## 2023-02-10 PROCEDURE — 85018 HEMOGLOBIN: CPT | Performed by: FAMILY MEDICINE

## 2023-02-10 PROCEDURE — 93010 ELECTROCARDIOGRAM REPORT: CPT | Performed by: INTERNAL MEDICINE

## 2023-02-10 NOTE — PROGRESS NOTES
[address]  [place] [address]  Phone: [phone]  Fax: [phone]  Primary Provider: Alonzo Florez  Pre-op Performing Provider:    ALONZO FLOREZ  VIDEO,       PREOPERATIVE EVALUATION:  Today's date: 2/10/2023    Jam Mckeon is a 53 year old male who presents for a preoperative evaluation.    Surgical Information:  Surgery/Procedure: rt knee  Surgery Location: [facility]  Surgeon: Dr Kei Moore   Surgery Date: 2/23/2023  Time of Surgery: 7:00 am  Where patient plans to recover: At home with family  Fax number for surgical facility: [phone]    Type of Anesthesia Anticipated: General    Assessment & Plan     The proposed surgical procedure is considered INTERMEDIATE risk.    Preoperative examination      Primary osteoarthritis of right knee    - EKG 12-lead, tracing only  - Basic metabolic panel  (Ca, Cl, CO2, Creat, Gluc, K, Na, BUN)  - Hemoglobin  - Basic metabolic panel  (Ca, Cl, CO2, Creat, Gluc, K, Na, BUN)  - Hemoglobin    Type 2 diabetes mellitus with stage 3a chronic kidney disease, without long-term current use of insulin (H)    - Hemoglobin A1c  - Hemoglobin A1c    Glucometer, strips and lancets ordered.      Mixed hyperlipidemia    - Lipid panel reflex to direct LDL Fasting  - Lipid panel reflex to direct LDL Fasting             Medication Instructions:  Patient is to take all scheduled medications on the day of surgery EXCEPT for modifications listed below:   - metformin: HOLD day of surgery.    RECOMMENDATION:  APPROVAL GIVEN to proceed with proposed procedure, without further diagnostic evaluation.            Subjective     HPI related to upcoming procedure: Right knee arthritis.  Diabetes, controlled.  A1c 7.3    Preop Questions 2/10/2023   1. Have you ever had a heart attack or stroke? No   2. Have you ever had surgery on your heart or blood vessels, such as a stent placement, a coronary artery  bypass, or surgery on an artery in your head, neck, heart, or legs? No   3. Do you have chest pain with activity? No   4. Do you have a history of  heart failure? No   5. Do you currently have a cold, bronchitis or symptoms of other infection? No   6. Do you have a cough, shortness of breath, or wheezing? No   7. Do you or anyone in your family have previous history of blood clots? No   8. Do you or does anyone in your family have a serious bleeding problem such as prolonged bleeding following surgeries or cuts? No   9. Have you ever had problems with anemia or been told to take iron pills? No   10. Have you had any abnormal blood loss such as black, tarry or bloody stools? No   11. Have you ever had a blood transfusion? YES -    11a. Have you ever had a transfusion reaction? No   12. Are you willing to have a blood transfusion if it is medically needed before, during, or after your surgery? Yes   13. Have you or any of your relatives ever had problems with anesthesia? No   14. Do you have sleep apnea, excessive snoring or daytime drowsiness? No   15. Do you have any artifical heart valves or other implanted medical devices like a pacemaker, defibrillator, or continuous glucose monitor? No   16. Do you have artificial joints? No   17. Are you allergic to latex? No           Preoperative Review of :   reviewed - no record of controlled substances prescribed.          Review of Systems  CONSTITUTIONAL: NEGATIVE for fever, chills, change in weight  INTEGUMENTARY/SKIN: NEGATIVE for worrisome rashes, moles or lesions  EYES: NEGATIVE for vision changes or irritation  ENT/MOUTH: NEGATIVE for ear, mouth and throat problems  RESP: NEGATIVE for significant cough or SOB  CV: NEGATIVE for chest pain, palpitations or peripheral edema  GI: NEGATIVE for nausea, abdominal pain, heartburn, or change in bowel habits  : NEGATIVE for frequency, dysuria, or hematuria  MUSCULOSKELETAL: NEGATIVE for significant arthralgias or  myalgia  NEURO: NEGATIVE for weakness, dizziness or paresthesias  ENDOCRINE: NEGATIVE for temperature intolerance, skin/hair changes  HEME: NEGATIVE for bleeding problems  PSYCHIATRIC: NEGATIVE for changes in mood or affect    Patient Active Problem List    Diagnosis Date Noted     Acute renal failure superimposed on stage 3 chronic kidney disease, unspecified acute renal failure type      Priority: Medium     IMO Regulatory Load OCT 2020         Osteoarthritis of both knees, unspecified osteoarthritis type 11/27/2019     Priority: Medium     Hemoptysis      Priority: Medium     Cavitary lesion of lung      Priority: Medium     Hypoxia      Priority: Medium     Pleural effusion on right      Priority: Medium     Stage 3 chronic kidney disease (H) 06/06/2019     Priority: Medium     Iron deficiency anemia due to chronic blood loss 06/06/2019     Priority: Medium     Tuberculosis 05/31/2019     Priority: Medium     Weight loss      Priority: Medium     Type 2 diabetes mellitus with stage 3 chronic kidney disease, without long-term current use of insulin (H) 05/21/2019     Priority: Medium     Pulmonary tuberculosis with cavitation 05/21/2019     Priority: Medium      Past Medical History:   Diagnosis Date     DM2 (diabetes mellitus, type 2) (H)      Hemoptysis      Pulmonary tuberculosis with cavitation      Past Surgical History:   Procedure Laterality Date     NO PAST SURGERIES       Current Outpatient Medications   Medication Sig Dispense Refill     ACCU-CHEK GUIDE test strip TEST DAILY 50 strip 11     acetaminophen (TYLENOL) 325 MG tablet TAKE 2 TABLETS(650 MG) BY MOUTH EVERY 4 HOURS AS NEEDED 100 tablet 0     blood glucose meter (GLUCOMETER) [BLOOD GLUCOSE METER (GLUCOMETER)] Use 1 each As Directed daily. Dispense glucometer brand per patient's insurance at pharmacy discretion. 1 each 0     blood glucose monitoring (SOFTCLIX) lancets 1 each by In Vitro route daily 100 each 3     FEROSUL 325 (65 Fe) MG tablet TAKE  "1 TABLET(325 MG) BY MOUTH DAILY WITH BREAKFAST 30 tablet 11     generic lancets (FINGERSTIX LANCETS) [GENERIC LANCETS (FINGERSTIX LANCETS)] Use daily.  Dispense brand per patient's insurance at pharmacy discretion. 50 each 11     lisinopril (ZESTRIL) 2.5 MG tablet TAKE 1 TABLET(2.5 MG) BY MOUTH DAILY 90 tablet 3     metFORMIN (GLUCOPHAGE XR) 500 MG 24 hr tablet Take 2 tablets (1,000 mg) by mouth daily 60 tablet 11     triamcinolone (KENALOG) 0.1 % external cream APPLY 1 GRAM TOPICALLY TO LOWER LEGS TWICE DAILY 80 g 5       No Known Allergies     Social History     Tobacco Use     Smoking status: Former     Types: Cigarettes     Quit date: 10/24/2018     Years since quittin.3     Smokeless tobacco: Former     Tobacco comments:     no passive exposure   Substance Use Topics     Alcohol use: Not Currently     Comment: Alcoholic Drinks/day: Beer.       History   Drug Use No         Objective     /83   Pulse 93   Temp 98.4  F (36.9  C) (Oral)   Resp 16   Ht 1.665 m (5' 5.55\")   Wt 77 kg (169 lb 12 oz)   SpO2 98%   BMI 27.78 kg/m      Physical Exam  Eyes: EOM full, pupils normal, conjunctivae normal  Ears: TM's and canals normal  Oropharynx: normal  Neck: supple without adenopathy or thyromegaly  Lungs: normal  Heart: regular rhythm, normal rate, no murmur  Abdomen: no HSM, mass or tenderness  Extremities: FROM, normal strength and sensation      Recent Labs   Lab Test 10/18/22  1036 22  0944 22  0907 02/15/22  0914 02/15/22  0843 10/12/21  1023   HGB  --  14.7  --   --   --   --    INR  --   --  1.0  --   --   --    NA  --   --   --  139  --  140   POTASSIUM  --   --   --  3.9  --  4.0   CR  --   --   --  1.48*  --  1.18   A1C 7.1* 6.7*  --   --    < >  --     < > = values in this interval not displayed.        Diagnostics:  Recent Results (from the past 240 hour(s))   Lipid panel reflex to direct LDL Fasting    Collection Time: 02/10/23 11:00 AM   Result Value Ref Range    Cholesterol 211 " (H) <200 mg/dL    Triglycerides 146 <150 mg/dL    Direct Measure HDL 44 >=40 mg/dL    LDL Cholesterol Calculated 138 (H) <=100 mg/dL    Non HDL Cholesterol 167 (H) <130 mg/dL   Basic metabolic panel  (Ca, Cl, CO2, Creat, Gluc, K, Na, BUN)    Collection Time: 02/10/23 11:00 AM   Result Value Ref Range    Sodium 139 136 - 145 mmol/L    Potassium 4.1 3.4 - 5.3 mmol/L    Chloride 103 98 - 107 mmol/L    Carbon Dioxide (CO2) 21 (L) 22 - 29 mmol/L    Anion Gap 15 7 - 15 mmol/L    Urea Nitrogen 15.0 6.0 - 20.0 mg/dL    Creatinine 1.42 (H) 0.67 - 1.17 mg/dL    Calcium 9.6 8.6 - 10.0 mg/dL    Glucose 133 (H) 70 - 99 mg/dL    GFR Estimate 59 (L) >60 mL/min/1.73m2   Hemoglobin    Collection Time: 02/10/23 11:00 AM   Result Value Ref Range    Hemoglobin 15.7 13.3 - 17.7 g/dL   Hemoglobin A1c    Collection Time: 02/10/23 11:00 AM   Result Value Ref Range    Hemoglobin A1C 7.3 (H) 0.0 - 5.6 %   EKG 12-lead, tracing only    Collection Time: 02/10/23 11:09 AM   Result Value Ref Range    Systolic Blood Pressure 127 mmHg    Diastolic Blood Pressure 83 mmHg    Ventricular Rate 84 BPM    Atrial Rate 84 BPM    HI Interval 186 ms    QRS Duration 90 ms     ms    QTc 460 ms    P Axis 69 degrees    R AXIS 43 degrees    T Axis 63 degrees    Interpretation ECG       Sinus rhythm  Nonspecific T wave abnormality  Prolonged QT  Abnormal ECG  When compared with ECG of 06-JUN-2019 05:08,  Vent. rate has decreased BY  57 BPM  ST elevation now present in Inferior leads  ST no longer depressed in Anterior leads  Nonspecific T wave abnormality now evident in Lateral leads        EKG: appears normal, NSR, normal axis, normal intervals, no acute ST/T changes c/w ischemia, no LVH by voltage criteria    Revised Cardiac Risk Index (RCRI):  The patient has the following serious cardiovascular risks for perioperative complications:   - No serious cardiac risks = 0 points     RCRI Interpretation: 0 points: Class I (very low risk - 0.4% complication  rate)           Signed Electronically by: Alonzo Florez MD  Copy of this evaluation report is provided to requesting physician.

## 2023-02-11 ENCOUNTER — HEALTH MAINTENANCE LETTER (OUTPATIENT)
Age: 53
End: 2023-02-11

## 2023-02-17 ENCOUNTER — LAB REQUISITION (OUTPATIENT)
Dept: LAB | Facility: HOSPITAL | Age: 53
End: 2023-02-17
Payer: COMMERCIAL

## 2023-02-17 DIAGNOSIS — Z00.00 ENCOUNTER FOR GENERAL ADULT MEDICAL EXAMINATION WITHOUT ABNORMAL FINDINGS: ICD-10-CM

## 2023-02-17 DIAGNOSIS — Z01.818 ENCOUNTER FOR OTHER PREPROCEDURAL EXAMINATION: ICD-10-CM

## 2023-02-17 LAB
ERYTHROCYTE [DISTWIDTH] IN BLOOD BY AUTOMATED COUNT: 12.9 % (ref 10–15)
HCT VFR BLD AUTO: 48.1 % (ref 40–53)
HGB BLD-MCNC: 15.6 G/DL (ref 13.3–17.7)
MCH RBC QN AUTO: 27 PG (ref 26.5–33)
MCHC RBC AUTO-ENTMCNC: 32.4 G/DL (ref 31.5–36.5)
MCV RBC AUTO: 83 FL (ref 78–100)
PLATELET # BLD AUTO: 156 10E3/UL (ref 150–450)
RBC # BLD AUTO: 5.77 10E6/UL (ref 4.4–5.9)
WBC # BLD AUTO: 10.6 10E3/UL (ref 4–11)

## 2023-02-17 PROCEDURE — 85027 COMPLETE CBC AUTOMATED: CPT | Mod: ORL | Performed by: CLINICAL NURSE SPECIALIST

## 2023-02-17 PROCEDURE — 36415 COLL VENOUS BLD VENIPUNCTURE: CPT | Mod: ORL | Performed by: CLINICAL NURSE SPECIALIST

## 2023-02-20 LAB
ATRIAL RATE - MUSE: 84 BPM
DIASTOLIC BLOOD PRESSURE - MUSE: 83 MMHG
INTERPRETATION ECG - MUSE: NORMAL
P AXIS - MUSE: 69 DEGREES
PR INTERVAL - MUSE: 186 MS
QRS DURATION - MUSE: 90 MS
QT - MUSE: 390 MS
QTC - MUSE: 460 MS
R AXIS - MUSE: 43 DEGREES
SYSTOLIC BLOOD PRESSURE - MUSE: 127 MMHG
T AXIS - MUSE: 63 DEGREES
VENTRICULAR RATE- MUSE: 84 BPM

## 2023-02-23 ENCOUNTER — TRANSFERRED RECORDS (OUTPATIENT)
Dept: HEALTH INFORMATION MANAGEMENT | Facility: CLINIC | Age: 53
End: 2023-02-23

## 2023-03-24 ENCOUNTER — TRANSFERRED RECORDS (OUTPATIENT)
Dept: HEALTH INFORMATION MANAGEMENT | Facility: CLINIC | Age: 53
End: 2023-03-24
Payer: COMMERCIAL

## 2023-03-24 LAB
ALT SERPL-CCNC: 18 IU/L (ref 0–44)
AST SERPL-CCNC: 16 IU/L (ref 0–40)

## 2023-04-03 ENCOUNTER — TRANSFERRED RECORDS (OUTPATIENT)
Dept: HEALTH INFORMATION MANAGEMENT | Facility: CLINIC | Age: 53
End: 2023-04-03
Payer: COMMERCIAL

## 2023-05-24 DIAGNOSIS — R80.9 MICROALBUMINURIA DUE TO TYPE 2 DIABETES MELLITUS (H): ICD-10-CM

## 2023-05-24 DIAGNOSIS — E11.29 MICROALBUMINURIA DUE TO TYPE 2 DIABETES MELLITUS (H): ICD-10-CM

## 2023-05-24 RX ORDER — LISINOPRIL 2.5 MG/1
TABLET ORAL
Qty: 90 TABLET | Refills: 3 | Status: SHIPPED | OUTPATIENT
Start: 2023-05-24 | End: 2023-11-10

## 2023-05-24 NOTE — TELEPHONE ENCOUNTER
"Routing refill request to provider for review/approval because:  Labs out of range:  CR 1.42    Last Written Prescription Date:  6-1-22  Last Fill Quantity: 90,  # refills: 3   Last office visit provider: 2-10-23     Requested Prescriptions   Pending Prescriptions Disp Refills     lisinopril (ZESTRIL) 2.5 MG tablet [Pharmacy Med Name: LISINOPRIL 2.5MG TABLETS] 90 tablet 3     Sig: TAKE 1 TABLET(2.5 MG) BY MOUTH DAILY       ACE Inhibitors (Including Combos) Protocol Failed - 5/24/2023  3:51 AM        Failed - Normal serum creatinine on file in past 12 months     Recent Labs   Lab Test 02/10/23  1100   CR 1.42*       Ok to refill medication if creatinine is low          Passed - Blood pressure under 140/90 in past 12 months     BP Readings from Last 3 Encounters:   02/10/23 127/83   10/18/22 126/76   06/17/22 118/72                 Passed - Recent (12 mo) or future (30 days) visit within the authorizing provider's specialty     Patient has had an office visit with the authorizing provider or a provider within the authorizing providers department within the previous 12 mos or has a future within next 30 days. See \"Patient Info\" tab in inbasket, or \"Choose Columns\" in Meds & Orders section of the refill encounter.              Passed - Medication is active on med list        Passed - Patient is age 18 or older        Passed - Normal serum potassium on file in past 12 months     Recent Labs   Lab Test 02/10/23  1100   POTASSIUM 4.1                  Rissa Fox RN 05/24/23 4:47 PM  "

## 2023-06-16 ENCOUNTER — OFFICE VISIT (OUTPATIENT)
Dept: FAMILY MEDICINE | Facility: CLINIC | Age: 53
End: 2023-06-16
Payer: COMMERCIAL

## 2023-06-16 VITALS
WEIGHT: 173 LBS | SYSTOLIC BLOOD PRESSURE: 129 MMHG | OXYGEN SATURATION: 100 % | BODY MASS INDEX: 27.15 KG/M2 | HEART RATE: 73 BPM | DIASTOLIC BLOOD PRESSURE: 86 MMHG | HEIGHT: 67 IN | TEMPERATURE: 97.7 F

## 2023-06-16 DIAGNOSIS — E11.22 TYPE 2 DIABETES MELLITUS WITH STAGE 3A CHRONIC KIDNEY DISEASE, WITHOUT LONG-TERM CURRENT USE OF INSULIN (H): Primary | ICD-10-CM

## 2023-06-16 DIAGNOSIS — E78.2 MIXED HYPERLIPIDEMIA: ICD-10-CM

## 2023-06-16 DIAGNOSIS — R51.9 NONINTRACTABLE HEADACHE, UNSPECIFIED CHRONICITY PATTERN, UNSPECIFIED HEADACHE TYPE: ICD-10-CM

## 2023-06-16 DIAGNOSIS — N18.31 TYPE 2 DIABETES MELLITUS WITH STAGE 3A CHRONIC KIDNEY DISEASE, WITHOUT LONG-TERM CURRENT USE OF INSULIN (H): Primary | ICD-10-CM

## 2023-06-16 LAB
CREAT UR-MCNC: 29.9 MG/DL
HBA1C MFR BLD: 7.3 % (ref 0–5.6)
MICROALBUMIN UR-MCNC: 758 MG/L
MICROALBUMIN/CREAT UR: 2535.12 MG/G CR (ref 0–17)

## 2023-06-16 PROCEDURE — 99214 OFFICE O/P EST MOD 30 MIN: CPT | Performed by: FAMILY MEDICINE

## 2023-06-16 PROCEDURE — 36415 COLL VENOUS BLD VENIPUNCTURE: CPT | Performed by: FAMILY MEDICINE

## 2023-06-16 PROCEDURE — 82043 UR ALBUMIN QUANTITATIVE: CPT | Performed by: FAMILY MEDICINE

## 2023-06-16 PROCEDURE — 82570 ASSAY OF URINE CREATININE: CPT | Performed by: FAMILY MEDICINE

## 2023-06-16 PROCEDURE — 83036 HEMOGLOBIN GLYCOSYLATED A1C: CPT | Performed by: FAMILY MEDICINE

## 2023-06-16 RX ORDER — ATORVASTATIN CALCIUM 20 MG/1
20 TABLET, FILM COATED ORAL DAILY
Qty: 30 TABLET | Refills: 11 | Status: SHIPPED | OUTPATIENT
Start: 2023-06-16 | End: 2024-06-10

## 2023-06-21 DIAGNOSIS — D50.8 OTHER IRON DEFICIENCY ANEMIA: ICD-10-CM

## 2023-06-21 RX ORDER — FERROUS SULFATE 325(65) MG
TABLET ORAL
Qty: 90 TABLET | Refills: 2 | Status: SHIPPED | OUTPATIENT
Start: 2023-06-21 | End: 2024-07-25

## 2023-06-21 NOTE — TELEPHONE ENCOUNTER
"Last Written Prescription Date:  5/30/22  Last Fill Quantity: 30,  # refills: 11   Last office visit provider:   6/16/23    Requested Prescriptions   Pending Prescriptions Disp Refills     FEROSUL 325 (65 Fe) MG tablet [Pharmacy Med Name: FERROUS SULFATE 325MG (5GR) TABS] 30 tablet 11     Sig: TAKE 1 TABLET(325 MG) BY MOUTH DAILY WITH BREAKFAST       Iron Supplements Passed - 6/21/2023  3:51 AM        Passed - Patient is 12 years of age or older        Passed - Recent (12 mo) or future (30 days) visit within the authorizing provider's specialty     Patient has had an office visit with the authorizing provider or a provider within the authorizing providers department within the previous 12 mos or has a future within next 30 days. See \"Patient Info\" tab in inbasket, or \"Choose Columns\" in Meds & Orders section of the refill encounter.              Passed - Hgb OR Hct on record within the past 12 mos.     Patient need only have had a HGB or HCT on file in the past 12 mos. That result does not need to be normal.    Recent Labs   Lab Test 02/17/23  1047 02/10/23  1100 06/17/22  0944   HGB 15.6 15.7 14.7     Recent Labs   Lab Test 02/17/23  1047 11/11/20  0023 06/26/19  0705   HCT 48.1 41.1 30.0*       Please verify a HGB or HCT has been checked SINCE THE LAST DOSE CHANGE.            Passed - Medication is active on med list             Shannon Tesfaye RN 06/21/23 2:41 PM  "

## 2023-08-08 ENCOUNTER — TRANSFERRED RECORDS (OUTPATIENT)
Dept: HEALTH INFORMATION MANAGEMENT | Facility: CLINIC | Age: 53
End: 2023-08-08

## 2023-08-08 LAB — RETINOPATHY: NEGATIVE

## 2023-10-18 DIAGNOSIS — E11.22 TYPE 2 DIABETES MELLITUS WITH STAGE 3A CHRONIC KIDNEY DISEASE, WITHOUT LONG-TERM CURRENT USE OF INSULIN (H): ICD-10-CM

## 2023-10-18 DIAGNOSIS — N18.31 TYPE 2 DIABETES MELLITUS WITH STAGE 3A CHRONIC KIDNEY DISEASE, WITHOUT LONG-TERM CURRENT USE OF INSULIN (H): ICD-10-CM

## 2023-10-18 RX ORDER — METFORMIN HCL 500 MG
1000 TABLET, EXTENDED RELEASE 24 HR ORAL DAILY
Qty: 180 TABLET | Refills: 1 | Status: SHIPPED | OUTPATIENT
Start: 2023-10-18 | End: 2023-10-20

## 2023-10-20 ENCOUNTER — OFFICE VISIT (OUTPATIENT)
Dept: FAMILY MEDICINE | Facility: CLINIC | Age: 53
End: 2023-10-20
Attending: FAMILY MEDICINE
Payer: COMMERCIAL

## 2023-10-20 VITALS
OXYGEN SATURATION: 99 % | WEIGHT: 176.08 LBS | TEMPERATURE: 97.5 F | SYSTOLIC BLOOD PRESSURE: 134 MMHG | HEART RATE: 81 BPM | HEIGHT: 67 IN | RESPIRATION RATE: 16 BRPM | DIASTOLIC BLOOD PRESSURE: 71 MMHG | BODY MASS INDEX: 27.64 KG/M2

## 2023-10-20 DIAGNOSIS — E11.22 TYPE 2 DIABETES MELLITUS WITH STAGE 3A CHRONIC KIDNEY DISEASE, WITHOUT LONG-TERM CURRENT USE OF INSULIN (H): Primary | ICD-10-CM

## 2023-10-20 DIAGNOSIS — N18.31 TYPE 2 DIABETES MELLITUS WITH STAGE 3A CHRONIC KIDNEY DISEASE, WITHOUT LONG-TERM CURRENT USE OF INSULIN (H): Primary | ICD-10-CM

## 2023-10-20 DIAGNOSIS — E78.2 MIXED HYPERLIPIDEMIA: ICD-10-CM

## 2023-10-20 DIAGNOSIS — Z23 NEED FOR VACCINATION: ICD-10-CM

## 2023-10-20 LAB
ALBUMIN SERPL BCG-MCNC: 3.9 G/DL (ref 3.5–5.2)
ALP SERPL-CCNC: 112 U/L (ref 40–129)
ALT SERPL W P-5'-P-CCNC: 35 U/L (ref 0–70)
ANION GAP SERPL CALCULATED.3IONS-SCNC: 10 MMOL/L (ref 7–15)
AST SERPL W P-5'-P-CCNC: 25 U/L (ref 0–45)
BILIRUB SERPL-MCNC: 0.3 MG/DL
BUN SERPL-MCNC: 15.6 MG/DL (ref 6–20)
CALCIUM SERPL-MCNC: 9.6 MG/DL (ref 8.6–10)
CHLORIDE SERPL-SCNC: 104 MMOL/L (ref 98–107)
CHOLEST SERPL-MCNC: 104 MG/DL
CREAT SERPL-MCNC: 1.61 MG/DL (ref 0.67–1.17)
DEPRECATED HCO3 PLAS-SCNC: 23 MMOL/L (ref 22–29)
EGFRCR SERPLBLD CKD-EPI 2021: 51 ML/MIN/1.73M2
GLUCOSE SERPL-MCNC: 187 MG/DL (ref 70–99)
HBA1C MFR BLD: 8.4 % (ref 0–5.6)
HDLC SERPL-MCNC: 36 MG/DL
LDLC SERPL CALC-MCNC: 42 MG/DL
NONHDLC SERPL-MCNC: 68 MG/DL
POTASSIUM SERPL-SCNC: 3.6 MMOL/L (ref 3.4–5.3)
PROT SERPL-MCNC: 7.7 G/DL (ref 6.4–8.3)
SODIUM SERPL-SCNC: 137 MMOL/L (ref 135–145)
TRIGL SERPL-MCNC: 129 MG/DL

## 2023-10-20 PROCEDURE — 36415 COLL VENOUS BLD VENIPUNCTURE: CPT | Performed by: FAMILY MEDICINE

## 2023-10-20 PROCEDURE — 90471 IMMUNIZATION ADMIN: CPT | Performed by: FAMILY MEDICINE

## 2023-10-20 PROCEDURE — 99214 OFFICE O/P EST MOD 30 MIN: CPT | Mod: 25 | Performed by: FAMILY MEDICINE

## 2023-10-20 PROCEDURE — 90480 ADMN SARSCOV2 VAC 1/ONLY CMP: CPT | Performed by: FAMILY MEDICINE

## 2023-10-20 PROCEDURE — 80053 COMPREHEN METABOLIC PANEL: CPT | Performed by: FAMILY MEDICINE

## 2023-10-20 PROCEDURE — 90746 HEPB VACCINE 3 DOSE ADULT IM: CPT | Performed by: FAMILY MEDICINE

## 2023-10-20 PROCEDURE — 91320 SARSCV2 VAC 30MCG TRS-SUC IM: CPT | Performed by: FAMILY MEDICINE

## 2023-10-20 PROCEDURE — 80061 LIPID PANEL: CPT | Performed by: FAMILY MEDICINE

## 2023-10-20 PROCEDURE — 83036 HEMOGLOBIN GLYCOSYLATED A1C: CPT | Performed by: FAMILY MEDICINE

## 2023-10-20 RX ORDER — METFORMIN HCL 500 MG
1500 TABLET, EXTENDED RELEASE 24 HR ORAL DAILY
Qty: 270 TABLET | Refills: 3 | Status: SHIPPED | OUTPATIENT
Start: 2023-10-20

## 2023-10-20 NOTE — PROGRESS NOTES
"  Assessment & Plan     Type 2 diabetes mellitus with stage 3a chronic kidney disease, without long-term current use of insulin (H)    Not controlled.    Increase metformin to 3 tablets daily.    Recheck in 4 months.      - PRIMARY CARE FOLLOW-UP SCHEDULING  - Hemoglobin A1c  - Hemoglobin A1c  - metFORMIN (GLUCOPHAGE XR) 500 MG 24 hr tablet  Dispense: 270 tablet; Refill: 3  - PRIMARY CARE FOLLOW-UP SCHEDULING    Mixed hyperlipidemia    Stable.    - Lipid panel reflex to direct LDL Fasting  - Comprehensive metabolic panel (BMP + Alb, Alk Phos, ALT, AST, Total. Bili, TP)  - Lipid panel reflex to direct LDL Fasting  - Comprehensive metabolic panel (BMP + Alb, Alk Phos, ALT, AST, Total. Bili, TP)    Need for vaccination    - HEPATITIS B, ADULT 20+ (ENGERIX-B/RECOMBIVAX HB)  - COVID-19 12+ (2023-24) (PFIZER)        30 minutes spent by me on the date of the encounter doing chart review, review of test results, and patient visit regarding diabetes, HLD.       BMI:   Estimated body mass index is 27.96 kg/m  as calculated from the following:    Height as of this encounter: 1.69 m (5' 6.54\").    Weight as of this encounter: 79.9 kg (176 lb 1.3 oz).           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  He is checking home blood glucose one time daily.   He checks blood glucose before meals.  Blood glucose is sometimes over 200 and never under 70. He is aware of hypoglycemia symptoms including shakiness, dizziness, weakness and blurred vision.   He is concerned about blood sugar frequently over 200.   He is having burning in feet and blurry vision.                      He limits rice to a fist-sized portion at meals.    Hep B core Abigail was " "positive Feb. 2022.  Surface Abigail and Ag were negative.    Current Outpatient Medications   Medication Sig Dispense Refill    ACCU-CHEK GUIDE test strip TEST DAILY 50 strip 11    acetaminophen (TYLENOL) 325 MG tablet TAKE 2 TABLETS(650 MG) BY MOUTH EVERY 4 HOURS AS NEEDED 100 tablet 0    atorvastatin (LIPITOR) 20 MG tablet Take 1 tablet (20 mg) by mouth daily 30 tablet 11    blood glucose (NO BRAND SPECIFIED) lancets standard Use to test blood sugar 1 time daily or as directed. 100 lancet 3    blood glucose (NO BRAND SPECIFIED) test strip Use to test blood sugar 1 time daily or as directed. 100 strip 3    blood glucose meter (GLUCOMETER) [BLOOD GLUCOSE METER (GLUCOMETER)] Use 1 each As Directed daily. Dispense glucometer brand per patient's insurance at pharmacy discretion. 1 each 0    blood glucose monitoring (NO BRAND SPECIFIED) meter device kit Use to test blood sugar 1 time daily or as directed. 1 kit 0    blood glucose monitoring (SOFTCLIX) lancets 1 each by In Vitro route daily 100 each 3    FEROSUL 325 (65 Fe) MG tablet TAKE 1 TABLET(325 MG) BY MOUTH DAILY WITH BREAKFAST 90 tablet 2    generic lancets (FINGERSTIX LANCETS) [GENERIC LANCETS (FINGERSTIX LANCETS)] Use daily.  Dispense brand per patient's insurance at pharmacy discretion. 50 each 11    lisinopril (ZESTRIL) 2.5 MG tablet TAKE 1 TABLET(2.5 MG) BY MOUTH DAILY 90 tablet 3    metFORMIN (GLUCOPHAGE XR) 500 MG 24 hr tablet Take 3 tablets (1,500 mg) by mouth daily 270 tablet 3    triamcinolone (KENALOG) 0.1 % external cream APPLY 1 GRAM TOPICALLY TO LOWER LEGS TWICE DAILY 80 g 5         Review of Systems   No fever.      Objective    /71   Pulse 81   Temp 97.5  F (36.4  C) (Temporal)   Resp 16   Ht 1.69 m (5' 6.54\")   Wt 79.9 kg (176 lb 1.3 oz)   SpO2 99%   BMI 27.96 kg/m    Body mass index is 27.96 kg/m .  Physical Exam   Heart normal  Lungs normal  Using a cane    A1c 8.4, had been 7.3                      "

## 2023-11-10 ENCOUNTER — OFFICE VISIT (OUTPATIENT)
Dept: FAMILY MEDICINE | Facility: CLINIC | Age: 53
End: 2023-11-10
Payer: COMMERCIAL

## 2023-11-10 VITALS
WEIGHT: 179 LBS | BODY MASS INDEX: 28.77 KG/M2 | HEART RATE: 68 BPM | HEIGHT: 66 IN | TEMPERATURE: 98.3 F | SYSTOLIC BLOOD PRESSURE: 158 MMHG | OXYGEN SATURATION: 100 % | DIASTOLIC BLOOD PRESSURE: 96 MMHG | RESPIRATION RATE: 16 BRPM

## 2023-11-10 DIAGNOSIS — Z00.00 ROUTINE GENERAL MEDICAL EXAMINATION AT A HEALTH CARE FACILITY: Primary | ICD-10-CM

## 2023-11-10 DIAGNOSIS — I10 ESSENTIAL HYPERTENSION: ICD-10-CM

## 2023-11-10 PROCEDURE — 99396 PREV VISIT EST AGE 40-64: CPT | Performed by: FAMILY MEDICINE

## 2023-11-10 RX ORDER — LISINOPRIL 5 MG/1
5 TABLET ORAL DAILY
Qty: 90 TABLET | Refills: 3 | Status: SHIPPED | OUTPATIENT
Start: 2023-11-10

## 2023-11-10 ASSESSMENT — ENCOUNTER SYMPTOMS
WEAKNESS: 1
ABDOMINAL PAIN: 0
NERVOUS/ANXIOUS: 0
EYE PAIN: 0
JOINT SWELLING: 1
HEMATOCHEZIA: 0
DIZZINESS: 1
HEADACHES: 1
DYSURIA: 0
PARESTHESIAS: 0
COUGH: 1
FEVER: 0
MYALGIAS: 1
PALPITATIONS: 0
CONSTIPATION: 0
FREQUENCY: 0
HEARTBURN: 0
ARTHRALGIAS: 1
CHILLS: 1
NAUSEA: 0
SORE THROAT: 0
SHORTNESS OF BREATH: 0
HEMATURIA: 0
DIARRHEA: 0

## 2023-11-10 NOTE — PROGRESS NOTES
"SUBJECTIVE:   CC: Jam is an 53 year old who presents for preventative health visit.       11/10/2023     9:17 AM   Additional Questions   Roomed by paw p   Accompanied by daughter       Healthy Habits:     Getting at least 3 servings of Calcium per day:  Yes    Bi-annual eye exam:  Yes    Dental care twice a year:  NO    Sleep apnea or symptoms of sleep apnea:  Daytime drowsiness    Diet:  Diabetic    Frequency of exercise:  2-3 days/week    Duration of exercise:  15-30 minutes    Taking medications regularly:  Yes    Medication side effects:  None    Additional concerns today:  No      Had colonoscopy 2021, repeat 7 years      Have you ever done Advance Care Planning? (For example, a Health Directive, POLST, or a discussion with a medical provider or your loved ones about your wishes): No, advance care planning information given to patient to review.  Patient declined advance care planning discussion at this time.    Social History     Tobacco Use    Smoking status: Former     Types: Cigarettes     Quit date: 10/24/2018     Years since quittin.0     Passive exposure: Never    Smokeless tobacco: Former     Types: Chew    Tobacco comments:     no passive exposure   Substance Use Topics    Alcohol use: Not Currently     Comment: Alcoholic Drinks/day: Beer.             11/10/2023     9:15 AM   Alcohol Use   Prescreen: >3 drinks/day or >7 drinks/week? Not Applicable       Last PSA: No results found for: \"PSA\"    Reviewed orders with patient. Reviewed health maintenance and updated orders accordingly - Yes      Reviewed and updated as needed this visit by clinical staff   Tobacco  Allergies  Meds              Reviewed and updated as needed this visit by Provider                 Current Outpatient Medications   Medication Sig Dispense Refill    ACCU-CHEK GUIDE test strip TEST DAILY 50 strip 11    acetaminophen (TYLENOL) 325 MG tablet TAKE 2 TABLETS(650 MG) BY MOUTH EVERY 4 HOURS AS NEEDED 100 tablet 0    " atorvastatin (LIPITOR) 20 MG tablet Take 1 tablet (20 mg) by mouth daily 30 tablet 11    blood glucose (NO BRAND SPECIFIED) lancets standard Use to test blood sugar 1 time daily or as directed. 100 lancet 3    blood glucose (NO BRAND SPECIFIED) test strip Use to test blood sugar 1 time daily or as directed. 100 strip 3    blood glucose meter (GLUCOMETER) [BLOOD GLUCOSE METER (GLUCOMETER)] Use 1 each As Directed daily. Dispense glucometer brand per patient's insurance at pharmacy discretion. 1 each 0    blood glucose monitoring (NO BRAND SPECIFIED) meter device kit Use to test blood sugar 1 time daily or as directed. 1 kit 0    blood glucose monitoring (SOFTCLIX) lancets 1 each by In Vitro route daily 100 each 3    FEROSUL 325 (65 Fe) MG tablet TAKE 1 TABLET(325 MG) BY MOUTH DAILY WITH BREAKFAST 90 tablet 2    generic lancets (FINGERSTIX LANCETS) [GENERIC LANCETS (FINGERSTIX LANCETS)] Use daily.  Dispense brand per patient's insurance at pharmacy discretion. 50 each 11    lisinopril (ZESTRIL) 5 MG tablet Take 1 tablet (5 mg) by mouth daily 90 tablet 3    metFORMIN (GLUCOPHAGE XR) 500 MG 24 hr tablet Take 3 tablets (1,500 mg) by mouth daily 270 tablet 3    triamcinolone (KENALOG) 0.1 % external cream APPLY 1 GRAM TOPICALLY TO LOWER LEGS TWICE DAILY 80 g 5         Review of Systems   Constitutional:  Positive for chills. Negative for fever.   HENT:  Negative for congestion, ear pain, hearing loss and sore throat.    Eyes:  Positive for visual disturbance. Negative for pain.   Respiratory:  Positive for cough. Negative for shortness of breath.    Cardiovascular:  Positive for chest pain and peripheral edema. Negative for palpitations.   Gastrointestinal:  Negative for abdominal pain, constipation, diarrhea, heartburn, hematochezia and nausea.   Genitourinary:  Negative for dysuria, frequency, genital sores, hematuria, impotence, penile discharge and urgency.   Musculoskeletal:  Positive for arthralgias, joint swelling  "and myalgias.   Skin:  Negative for rash.   Neurological:  Positive for dizziness, weakness and headaches. Negative for paresthesias.   Psychiatric/Behavioral:  Negative for mood changes. The patient is not nervous/anxious.          OBJECTIVE:   BP (!) 158/96   Pulse 68   Temp 98.3  F (36.8  C) (Oral)   Resp 16   Ht 1.676 m (5' 6\")   Wt 81.2 kg (179 lb)   SpO2 100%   BMI 28.89 kg/m      Physical Exam  Eyes: EOM full, pupils normal, conjunctivae normal  Ears: TM's and canals normal  Oropharynx: normal  Neck: supple without adenopathy or thyromegaly  Lungs: normal  Heart: regular rhythm, normal rate, no murmur  Abdomen: no HSM, mass or tenderness  : uncircumcised, penis and testes normal, no inguinal hernia or adenopathy  Pt declined SHAVON  Extremities: FROM, normal strength and sensation  Using a cane        ASSESSMENT/PLAN:   Jam was seen today for physical.    Diagnoses and all orders for this visit:    Routine general medical examination at a health care facility    Essential hypertension  -     lisinopril (ZESTRIL) 5 MG tablet; Take 1 tablet (5 mg) by mouth daily    Increase lisinopril from 2.5 mg to 5 mg.    Recheck 2/23/24.    Hep B dose #2 in future.      Patient has been advised of split billing requirements and indicates understanding: Yes      COUNSELING:   Reviewed preventive health counseling, as reflected in patient instructions       Healthy diet/nutrition      BMI:   Estimated body mass index is 28.89 kg/m  as calculated from the following:    Height as of this encounter: 1.676 m (5' 6\").    Weight as of this encounter: 81.2 kg (179 lb).         He reports that he quit smoking about 5 years ago. His smoking use included cigarettes. He has never been exposed to tobacco smoke. He has quit using smokeless tobacco.  His smokeless tobacco use included chew.            Alonzo Florez MD  [facility]  "

## 2023-11-27 ENCOUNTER — ALLIED HEALTH/NURSE VISIT (OUTPATIENT)
Dept: FAMILY MEDICINE | Facility: CLINIC | Age: 53
End: 2023-11-27
Payer: COMMERCIAL

## 2023-11-27 DIAGNOSIS — Z23 ENCOUNTER FOR IMMUNIZATION: Primary | ICD-10-CM

## 2023-11-27 PROCEDURE — 90746 HEPB VACCINE 3 DOSE ADULT IM: CPT

## 2023-11-27 PROCEDURE — 99207 PR NO CHARGE NURSE ONLY: CPT

## 2023-11-27 PROCEDURE — 90471 IMMUNIZATION ADMIN: CPT

## 2023-11-27 NOTE — PROGRESS NOTES
Prior to immunization administration, verified patients identity using patient s name and date of birth. Please see Immunization Activity for additional information.     Screening Questionnaire for Adult Immunization    Are you sick today?   No   Do you have allergies to medications, food, a vaccine component or latex?   No   Have you ever had a serious reaction after receiving a vaccination?   No   Do you have a long-term health problem with heart, lung, kidney, or metabolic disease (e.g., diabetes), asthma, a blood disorder, no spleen, complement component deficiency, a cochlear implant, or a spinal fluid leak?  Are you on long-term aspirin therapy?   No   Do you have cancer, leukemia, HIV/AIDS, or any other immune system problem?   No   Do you have a parent, brother, or sister with an immune system problem?   No   In the past 3 months, have you taken medications that affect  your immune system, such as prednisone, other steroids, or anticancer drugs; drugs for the treatment of rheumatoid arthritis, Crohn s disease, or psoriasis; or have you had radiation treatments?   No   Have you had a seizure, or a brain or other nervous system problem?   No   During the past year, have you received a transfusion of blood or blood    products, or been given immune (gamma) globulin or antiviral drug?   No   For women: Are you pregnant or is there a chance you could become       pregnant during the next month?   No   Have you received any vaccinations in the past 4 weeks?   No     Immunization questionnaire answers were all negative.    I have reviewed the following standing orders:   This patient is due and qualifies for the Hepatitis B vaccine.    Click here for Hepatitis B Standing Order    I have reviewed the vaccines inclusion and exclusion criteria; No concerns regarding eligibility.     Patient instructed to remain in clinic for 15 minutes afterwards, and to report any adverse reactions.     Screening performed by Yamile  YAS Sanchez on 11/27/2023 at 9:32 AM.

## 2024-01-15 ENCOUNTER — TRANSFERRED RECORDS (OUTPATIENT)
Dept: HEALTH INFORMATION MANAGEMENT | Facility: CLINIC | Age: 54
End: 2024-01-15
Payer: COMMERCIAL

## 2024-01-26 ENCOUNTER — TRANSFERRED RECORDS (OUTPATIENT)
Dept: HEALTH INFORMATION MANAGEMENT | Facility: CLINIC | Age: 54
End: 2024-01-26
Payer: COMMERCIAL

## 2024-01-28 DIAGNOSIS — Z76.0 ENCOUNTER FOR MEDICATION REFILL: ICD-10-CM

## 2024-02-02 RX ORDER — BLOOD SUGAR DIAGNOSTIC
STRIP MISCELLANEOUS
Qty: 50 STRIP | Refills: 11 | Status: SHIPPED | OUTPATIENT
Start: 2024-02-02

## 2024-02-07 ENCOUNTER — TRANSFERRED RECORDS (OUTPATIENT)
Dept: HEALTH INFORMATION MANAGEMENT | Facility: CLINIC | Age: 54
End: 2024-02-07
Payer: COMMERCIAL

## 2024-02-07 LAB
ALT SERPL-CCNC: 36 IU/L (ref 0–44)
AST SERPL-CCNC: 25 IU/L (ref 0–40)

## 2024-02-08 NOTE — PROGRESS NOTES
"  Assessment & Plan     Type 2 diabetes mellitus with stage 3a chronic kidney disease, without long-term current use of insulin (H)    Stable.    Continue metformin.    - Albumin Random Urine Quantitative with Creat Ratio  - Hemoglobin A1c  - Hemoglobin A1c  - Albumin Random Urine Quantitative with Creat Ratio  - PRIMARY CARE FOLLOW-UP SCHEDULING    Mixed hyperlipidemia    Not at goal.  Begin atorvastatin.    - atorvastatin (LIPITOR) 20 MG tablet  Dispense: 30 tablet; Refill: 11    Nonintractable headache, unspecified chronicity pattern, unspecified headache type    Stable.      Prescription drug management         BMI:   Estimated body mass index is 27.48 kg/m  as calculated from the following:    Height as of this encounter: 1.69 m (5' 6.54\").    Weight as of this encounter: 78.5 kg (173 lb).           Alonzo Florez MD  [facility] KARYNA Polk is a 53 year old, presenting for the following health issues:  Diabetes and Headache        2/10/2023    10:05 AM   Additional Questions   Roomed by ei   Accompanied by daughter     History of Present Illness       Diabetes:   He presents for follow up of diabetes.  He is checking home blood glucose two times daily. He checks blood glucose before and after meals.  Blood glucose is sometimes over 200 and sometimes under 70. He is aware of hypoglycemia symptoms including shakiness, dizziness and weakness. He has no concerns regarding his diabetes at this time.  He is not experiencing numbness or burning in feet, excessive thirst, blurry vision, weight changes or redness, sores or blisters on feet. The patient has not had a diabetic eye exam in the last 12 months.         Headaches:   Since the patient's last clinic visit, headaches are: no change  The patient is getting headaches:  A week now with headache  He is able to do normal daily activities when he has a migraine.  The patient is taking the following rescue/relief medications:  Tylenol " Patient calls stating that he cannot get his Viibryd from [facility] or [facility].  Patient would like to see if he can get mail order and have better luck with getting it in stock.      Ok per PCP for refill for pharmacy change   "  Patient states \"I get some relief\" from the rescue/relief medications.   The patient is taking the following medications to prevent migraines:  No medications to prevent migraines  In the past 4 weeks, the patient has gone to an Urgent Care or Emergency Room 0 times times due to headaches.    He eats 2-3 servings of fruits and vegetables daily.He consumes 1 sweetened beverage(s) daily.He exercises with enough effort to increase his heart rate 10 to 19 minutes per day.  He exercises with enough effort to increase his heart rate 4 days per week.   He is taking medications regularly.     LDL was 138.  Has not been on statin.    Smoke in the air worsens his headache.    He drinks 3 twelve oz bottles of water per day, and 16 oz of tea.    Left tinnitus x 20 minutes intermittently in past 6 months.    Rhinorrhea.    Had right knee replacement, and left knee injection.    Current Outpatient Medications   Medication Sig Dispense Refill     ACCU-CHEK GUIDE test strip TEST DAILY 50 strip 11     acetaminophen (TYLENOL) 325 MG tablet TAKE 2 TABLETS(650 MG) BY MOUTH EVERY 4 HOURS AS NEEDED 100 tablet 0     atorvastatin (LIPITOR) 20 MG tablet Take 1 tablet (20 mg) by mouth daily 30 tablet 11     blood glucose (NO BRAND SPECIFIED) lancets standard Use to test blood sugar 1 time daily or as directed. 100 lancet 3     blood glucose (NO BRAND SPECIFIED) test strip Use to test blood sugar 1 time daily or as directed. 100 strip 3     blood glucose meter (GLUCOMETER) [BLOOD GLUCOSE METER (GLUCOMETER)] Use 1 each As Directed daily. Dispense glucometer brand per patient's insurance at pharmacy discretion. 1 each 0     blood glucose monitoring (NO BRAND SPECIFIED) meter device kit Use to test blood sugar 1 time daily or as directed. 1 kit 0     blood glucose monitoring (SOFTCLIX) lancets 1 each by In Vitro route daily 100 each 3     FEROSUL 325 (65 Fe) MG tablet TAKE 1 TABLET(325 MG) BY MOUTH DAILY WITH BREAKFAST 30 tablet 11     generic " "lancets (FINGERSTIX LANCETS) [GENERIC LANCETS (FINGERSTIX LANCETS)] Use daily.  Dispense brand per patient's insurance at pharmacy discretion. 50 each 11     lisinopril (ZESTRIL) 2.5 MG tablet TAKE 1 TABLET(2.5 MG) BY MOUTH DAILY 90 tablet 3     metFORMIN (GLUCOPHAGE XR) 500 MG 24 hr tablet Take 2 tablets (1,000 mg) by mouth daily 60 tablet 11     triamcinolone (KENALOG) 0.1 % external cream APPLY 1 GRAM TOPICALLY TO LOWER LEGS TWICE DAILY 80 g 5                 Review of Systems   No fever.      Objective    /86   Pulse 73   Temp 97.7  F (36.5  C) (Oral)   Ht 1.69 m (5' 6.54\")   Wt 78.5 kg (173 lb)   SpO2 100%   BMI 27.48 kg/m    Body mass index is 27.48 kg/m .  Physical Exam   Heart normal  Lungs normal  Ears normal  Throat normal  Using a cane    A1c 7.3                    "

## 2024-02-23 ENCOUNTER — OFFICE VISIT (OUTPATIENT)
Dept: FAMILY MEDICINE | Facility: CLINIC | Age: 54
End: 2024-02-23
Payer: COMMERCIAL

## 2024-02-23 VITALS
WEIGHT: 178.12 LBS | TEMPERATURE: 97.8 F | HEART RATE: 76 BPM | SYSTOLIC BLOOD PRESSURE: 134 MMHG | RESPIRATION RATE: 16 BRPM | BODY MASS INDEX: 28.62 KG/M2 | HEIGHT: 66 IN | DIASTOLIC BLOOD PRESSURE: 72 MMHG

## 2024-02-23 DIAGNOSIS — N18.31 TYPE 2 DIABETES MELLITUS WITH STAGE 3A CHRONIC KIDNEY DISEASE, WITHOUT LONG-TERM CURRENT USE OF INSULIN (H): Primary | ICD-10-CM

## 2024-02-23 DIAGNOSIS — E78.2 MIXED HYPERLIPIDEMIA: ICD-10-CM

## 2024-02-23 DIAGNOSIS — E11.22 TYPE 2 DIABETES MELLITUS WITH STAGE 3A CHRONIC KIDNEY DISEASE, WITHOUT LONG-TERM CURRENT USE OF INSULIN (H): Primary | ICD-10-CM

## 2024-02-23 DIAGNOSIS — Z23 NEED FOR VACCINATION: ICD-10-CM

## 2024-02-23 DIAGNOSIS — K74.00 HEPATIC FIBROSIS: ICD-10-CM

## 2024-02-23 DIAGNOSIS — I10 ESSENTIAL HYPERTENSION: ICD-10-CM

## 2024-02-23 LAB — HBA1C MFR BLD: 7 % (ref 0–5.6)

## 2024-02-23 PROCEDURE — 83036 HEMOGLOBIN GLYCOSYLATED A1C: CPT | Performed by: FAMILY MEDICINE

## 2024-02-23 PROCEDURE — 36415 COLL VENOUS BLD VENIPUNCTURE: CPT | Performed by: FAMILY MEDICINE

## 2024-02-23 PROCEDURE — 99214 OFFICE O/P EST MOD 30 MIN: CPT | Performed by: FAMILY MEDICINE

## 2024-02-23 NOTE — PROGRESS NOTES
"  Assessment & Plan     Type 2 diabetes mellitus with stage 3a chronic kidney disease, without long-term current use of insulin (H)    Improved, almost at goal.  Continue metformin.    Recheck in 4 months.      - Hemoglobin A1c  - Hemoglobin A1c    Essential hypertension    Stable.  Continue lisinopril.    Mixed hyperlipidemia    Stable.  Continue atorvastatin.    Hepatic fibrosis    Stable.  Follows with GI.    Need for vaccination    3rd dose after 4/20/24.    - HEPATITIS B, ADULT 20+ (ENGERIX-B/RECOMBIVAX HB)      Prescription drug management        BMI  Estimated body mass index is 28.75 kg/m  as calculated from the following:    Height as of this encounter: 1.676 m (5' 6\").    Weight as of this encounter: 80.8 kg (178 lb 1.9 oz).             Subjective   Jam is a 54 year old, presenting for the following health issues:  Diabetes, Hypertension, and Imm/Inj (Heb b)        2/23/2024     2:33 PM   Additional Questions   Roomed by grazyna andre MA   Accompanied by daughter     History of Present Illness       Back Pain:  He presents for follow up of back pain. Patient's back pain is a chronic problem.  Location of back pain:  Right lower back and left lower back  Description of back pain: dull ache  Back pain spreads: right buttocks and right knee    Since patient first noticed back pain, pain is: always present, but gets better and worse  Does back pain interfere with his job:  Yes       CKD: He is not using over the counter pain medicine.     Diabetes:   He presents for follow up of diabetes.  He is checking home blood glucose one time daily.   He checks blood glucose before meals.  Blood glucose is never over 200 and never under 70. He is aware of hypoglycemia symptoms including shakiness, dizziness, weakness, blurred vision and confusion.   He is concerned about other.   He is having burning in feet, blurry vision and weight gain.            Hypertension: He presents for follow up of hypertension.  He does not check " "blood pressure  regularly outside of the clinic. Outpatient blood pressures have not been over 140/90. He follows a low salt diet.     Headaches:   Since the patient's last clinic visit, headaches are: no change  The patient is getting headaches:  Almost everyday  He is not able to do normal daily activities when he has a migraine.  The patient is taking the following rescue/relief medications:  Tylenol   Patient states \"The relief is inconsistent\" from the rescue/relief medications.   The patient is taking the following medications to prevent migraines:  No medications to prevent migraines  In the past 4 weeks, the patient has gone to an Urgent Care or Emergency Room 0 times times due to headaches.    Reason for visit:  Checkup blood pressure and sugar    He eats 2-3 servings of fruits and vegetables daily.He consumes 1 sweetened beverage(s) daily.He exercises with enough effort to increase his heart rate 10 to 19 minutes per day.  He exercises with enough effort to increase his heart rate 4 days per week.   He is taking medications regularly.             He has been eating carefully.    Had right knee replacement 1 year ago.    LDL was 42    Hepatitis C was treated 1 year ago, with undetectable level.    Current Outpatient Medications   Medication Sig Dispense Refill    ACCU-CHEK GUIDE test strip TEST DAILY 50 strip 11    acetaminophen (TYLENOL) 325 MG tablet TAKE 2 TABLETS(650 MG) BY MOUTH EVERY 4 HOURS AS NEEDED 100 tablet 0    atorvastatin (LIPITOR) 20 MG tablet Take 1 tablet (20 mg) by mouth daily 30 tablet 11    blood glucose (NO BRAND SPECIFIED) lancets standard Use to test blood sugar 1 time daily or as directed. 100 lancet 3    blood glucose (NO BRAND SPECIFIED) test strip Use to test blood sugar 1 time daily or as directed. 100 strip 3    blood glucose meter (GLUCOMETER) [BLOOD GLUCOSE METER (GLUCOMETER)] Use 1 each As Directed daily. Dispense glucometer brand per patient's insurance at pharmacy " "discretion. 1 each 0    blood glucose monitoring (NO BRAND SPECIFIED) meter device kit Use to test blood sugar 1 time daily or as directed. 1 kit 0    blood glucose monitoring (SOFTCLIX) lancets 1 each by In Vitro route daily 100 each 3    FEROSUL 325 (65 Fe) MG tablet TAKE 1 TABLET(325 MG) BY MOUTH DAILY WITH BREAKFAST 90 tablet 2    generic lancets (FINGERSTIX LANCETS) [GENERIC LANCETS (FINGERSTIX LANCETS)] Use daily.  Dispense brand per patient's insurance at pharmacy discretion. 50 each 11    lisinopril (ZESTRIL) 5 MG tablet Take 1 tablet (5 mg) by mouth daily 90 tablet 3    metFORMIN (GLUCOPHAGE XR) 500 MG 24 hr tablet Take 3 tablets (1,500 mg) by mouth daily 270 tablet 3    triamcinolone (KENALOG) 0.1 % external cream APPLY 1 GRAM TOPICALLY TO LOWER LEGS TWICE DAILY 80 g 5             Objective    /72   Pulse 76   Temp 97.8  F (36.6  C) (Oral)   Resp 16   Ht 1.676 m (5' 6\")   Wt 80.8 kg (178 lb 1.9 oz)   BMI 28.75 kg/m    Body mass index is 28.75 kg/m .  Physical Exam   Heart normal  Lungs normal  Using a cane    A1c 7.0, had been 8.4        Prior to immunization administration, verified patients identity using patient s name and date of birth. Please see Immunization Activity for additional information.     Screening Questionnaire for Adult Immunization    Are you sick today?     No   Do you have allergies to medications, food, a vaccine component or latex?   No   Have you ever had a serious reaction after receiving a vaccination?   No   Do you have a long-term health problem with heart, lung, kidney, or metabolic disease (e.g., diabetes), asthma, a blood disorder, no spleen, complement component deficiency, a cochlear implant, or a spinal fluid leak?  Are you on long-term aspirin therapy?   Yes, diabetes   Do you have cancer, leukemia, HIV/AIDS, or any other immune system problem?   No   Do you have a parent, brother, or sister with an immune system problem?   No   In the past 3 months, have you " taken medications that affect  your immune system, such as prednisone, other steroids, or anticancer drugs; drugs for the treatment of rheumatoid arthritis, Crohn s disease, or psoriasis; or have you had radiation treatments?   No   Have you had a seizure, or a brain or other nervous system problem?   No   During the past year, have you received a transfusion of blood or blood    products, or been given immune (gamma) globulin or antiviral drug?   No   For women: Are you pregnant or is there a chance you could become       pregnant during the next month?   No   Have you received any vaccinations in the past 4 weeks?   No     Immunization questionnaire was positive for at least one answer.   is aware.      Patient instructed to remain in clinic for 15 minutes afterwards, and to report any adverse reactions.     Screening performed by Satinder Molina MA on 2/23/2024 at 2:37 PM.         Signed Electronically by: Alonzo Florez MD

## 2024-03-11 ENCOUNTER — TRANSFERRED RECORDS (OUTPATIENT)
Dept: HEALTH INFORMATION MANAGEMENT | Facility: CLINIC | Age: 54
End: 2024-03-11
Payer: COMMERCIAL

## 2024-04-06 PROBLEM — K74.00 HEPATIC FIBROSIS: Status: ACTIVE | Noted: 2024-04-06

## 2024-04-06 PROBLEM — E78.2 MIXED HYPERLIPIDEMIA: Status: ACTIVE | Noted: 2024-04-06

## 2024-06-04 ENCOUNTER — OFFICE VISIT (OUTPATIENT)
Dept: FAMILY MEDICINE | Facility: CLINIC | Age: 54
End: 2024-06-04
Payer: COMMERCIAL

## 2024-06-04 VITALS
HEIGHT: 66 IN | SYSTOLIC BLOOD PRESSURE: 132 MMHG | WEIGHT: 178.6 LBS | RESPIRATION RATE: 16 BRPM | DIASTOLIC BLOOD PRESSURE: 84 MMHG | BODY MASS INDEX: 28.7 KG/M2 | TEMPERATURE: 98.1 F | OXYGEN SATURATION: 98 % | HEART RATE: 78 BPM

## 2024-06-04 DIAGNOSIS — E11.22 TYPE 2 DIABETES MELLITUS WITH STAGE 3A CHRONIC KIDNEY DISEASE, WITHOUT LONG-TERM CURRENT USE OF INSULIN (H): Primary | ICD-10-CM

## 2024-06-04 DIAGNOSIS — K74.00 HEPATIC FIBROSIS: ICD-10-CM

## 2024-06-04 DIAGNOSIS — I10 ESSENTIAL HYPERTENSION: ICD-10-CM

## 2024-06-04 DIAGNOSIS — N18.31 TYPE 2 DIABETES MELLITUS WITH STAGE 3A CHRONIC KIDNEY DISEASE, WITHOUT LONG-TERM CURRENT USE OF INSULIN (H): Primary | ICD-10-CM

## 2024-06-04 LAB
ANION GAP SERPL CALCULATED.3IONS-SCNC: 13 MMOL/L (ref 7–15)
BUN SERPL-MCNC: 13.6 MG/DL (ref 6–20)
CALCIUM SERPL-MCNC: 9.9 MG/DL (ref 8.6–10)
CHLORIDE SERPL-SCNC: 103 MMOL/L (ref 98–107)
CREAT SERPL-MCNC: 1.9 MG/DL (ref 0.67–1.17)
DEPRECATED HCO3 PLAS-SCNC: 22 MMOL/L (ref 22–29)
EGFRCR SERPLBLD CKD-EPI 2021: 41 ML/MIN/1.73M2
GLUCOSE SERPL-MCNC: 119 MG/DL (ref 70–99)
HBA1C MFR BLD: 7.5 % (ref 0–5.6)
POTASSIUM SERPL-SCNC: 5 MMOL/L (ref 3.4–5.3)
SODIUM SERPL-SCNC: 138 MMOL/L (ref 135–145)

## 2024-06-04 PROCEDURE — 99214 OFFICE O/P EST MOD 30 MIN: CPT | Performed by: FAMILY MEDICINE

## 2024-06-04 PROCEDURE — G2211 COMPLEX E/M VISIT ADD ON: HCPCS | Performed by: FAMILY MEDICINE

## 2024-06-04 PROCEDURE — 83036 HEMOGLOBIN GLYCOSYLATED A1C: CPT | Performed by: FAMILY MEDICINE

## 2024-06-04 PROCEDURE — 80048 BASIC METABOLIC PNL TOTAL CA: CPT | Performed by: FAMILY MEDICINE

## 2024-06-04 PROCEDURE — 36415 COLL VENOUS BLD VENIPUNCTURE: CPT | Performed by: FAMILY MEDICINE

## 2024-06-04 NOTE — PROGRESS NOTES
"  Assessment & Plan     Type 2 diabetes mellitus with stage 3a chronic kidney disease, without long-term current use of insulin (H)    Stable.    Continue 3 metformin per day.    Recheck in 4 months.      - Hemoglobin A1c  - Basic metabolic panel  (Ca, Cl, CO2, Creat, Gluc, K, Na, BUN)  - PRIMARY CARE FOLLOW-UP SCHEDULING  - Hemoglobin A1c  - Basic metabolic panel  (Ca, Cl, CO2, Creat, Gluc, K, Na, BUN)    Essential hypertension    Stable.    Continue lisinopril.      Hepatic fibrosis    Stable.  Continue with [facility].      The longitudinal plan of care for the diagnosis(es)/condition(s) as documented were addressed during this visit. Due to the added complexity in care, I will continue to support Jam in the subsequent management and with ongoing continuity of care.      Prescription drug management          BMI  Estimated body mass index is 28.83 kg/m  as calculated from the following:    Height as of this encounter: 1.676 m (5' 6\").    Weight as of this encounter: 81 kg (178 lb 9.6 oz).             Subjective   Jam is a 54 year old, presenting for the following health issues:  Follow up  (Dm and Med check )        6/4/2024    10:12 AM   Additional Questions   Roomed by hector andre   Accompanied by Daughter     History of Present Illness       Diabetes:   He presents for follow up of diabetes.  He is checking home blood glucose one time daily.   He checks blood glucose before meals.  Blood glucose is never over 200 and never under 70. He is aware of hypoglycemia symptoms including shakiness, dizziness, weakness, blurred vision and confusion.    He has no concerns regarding his diabetes at this time.  He is having burning in feet and blurry vision.            Hypertension: He presents for follow up of hypertension.  He does not check blood pressure  regularly outside of the clinic. Outpatient blood pressures have not been over 140/90. He follows a low salt diet.     He eats 2-3 servings of fruits and vegetables daily.He " consumes 1 sweetened beverage(s) daily.He exercises with enough effort to increase his heart rate 10 to 19 minutes per day.  He exercises with enough effort to increase his heart rate 4 days per week.   He is taking medications regularly.             Taking metformin.    Hepatic fibrosis was stable in March at [facility].    Fasting.    Current Outpatient Medications   Medication Sig Dispense Refill    ACCU-CHEK GUIDE test strip TEST DAILY 50 strip 11    acetaminophen (TYLENOL) 325 MG tablet TAKE 2 TABLETS(650 MG) BY MOUTH EVERY 4 HOURS AS NEEDED 100 tablet 0    atorvastatin (LIPITOR) 20 MG tablet Take 1 tablet (20 mg) by mouth daily 30 tablet 11    blood glucose (NO BRAND SPECIFIED) lancets standard Use to test blood sugar 1 time daily or as directed. 100 lancet 3    blood glucose (NO BRAND SPECIFIED) test strip Use to test blood sugar 1 time daily or as directed. 100 strip 3    blood glucose meter (GLUCOMETER) [BLOOD GLUCOSE METER (GLUCOMETER)] Use 1 each As Directed daily. Dispense glucometer brand per patient's insurance at pharmacy discretion. 1 each 0    blood glucose monitoring (NO BRAND SPECIFIED) meter device kit Use to test blood sugar 1 time daily or as directed. 1 kit 0    blood glucose monitoring (SOFTCLIX) lancets 1 each by In Vitro route daily 100 each 3    FEROSUL 325 (65 Fe) MG tablet TAKE 1 TABLET(325 MG) BY MOUTH DAILY WITH BREAKFAST 90 tablet 2    generic lancets (FINGERSTIX LANCETS) [GENERIC LANCETS (FINGERSTIX LANCETS)] Use daily.  Dispense brand per patient's insurance at pharmacy discretion. 50 each 11    lisinopril (ZESTRIL) 5 MG tablet Take 1 tablet (5 mg) by mouth daily 90 tablet 3    metFORMIN (GLUCOPHAGE XR) 500 MG 24 hr tablet Take 3 tablets (1,500 mg) by mouth daily 270 tablet 3    triamcinolone (KENALOG) 0.1 % external cream APPLY 1 GRAM TOPICALLY TO LOWER LEGS TWICE DAILY 80 g 5             Objective    /84   Pulse 78   Temp 98.1  F (36.7  C) (Oral)   Resp 16   Ht 1.676 m (5'  "6\")   Wt 81 kg (178 lb 9.6 oz)   SpO2 98%   BMI 28.83 kg/m    Body mass index is 28.83 kg/m .  Physical Exam     Heart normal  Lungs normal       Signed Electronically by: Alonzo Florez MD    "

## 2024-06-04 NOTE — LETTER
June 21, 2024      Sak [facility] Toe  [address] [facility]  [facility] [address]        Hi Sak,    Your kidney function decreased a little.  Stop taking meloxicam.    Resulted Orders   Hemoglobin A1c   Result Value Ref Range    Hemoglobin A1C 7.5 (H) 0.0 - 5.6 %      Comment:      Normal <5.7%   Prediabetes 5.7-6.4%    Diabetes 6.5% or higher     Note: Adopted from ADA consensus guidelines.   Basic metabolic panel  (Ca, Cl, CO2, Creat, Gluc, K, Na, BUN)   Result Value Ref Range    Sodium 138 135 - 145 mmol/L      Comment:      Reference intervals for this test were updated on 09/26/2023 to more accurately reflect our healthy population. There may be differences in the flagging of prior results with similar values performed with this method. Interpretation of those prior results can be made in the context of the updated reference intervals.     Potassium 5.0 3.4 - 5.3 mmol/L    Chloride 103 98 - 107 mmol/L    Carbon Dioxide (CO2) 22 22 - 29 mmol/L    Anion Gap 13 7 - 15 mmol/L    Urea Nitrogen 13.6 6.0 - 20.0 mg/dL    Creatinine 1.90 (H) 0.67 - 1.17 mg/dL    GFR Estimate 41 (L) >60 mL/min/1.73m2    Calcium 9.9 8.6 - 10.0 mg/dL    Glucose 119 (H) 70 - 99 mg/dL       If you have any questions or concerns, please call the clinic at the number listed above.       Sincerely,      Alonzo Florez MD

## 2024-06-10 DIAGNOSIS — E78.2 MIXED HYPERLIPIDEMIA: ICD-10-CM

## 2024-06-10 RX ORDER — ATORVASTATIN CALCIUM 20 MG/1
20 TABLET, FILM COATED ORAL DAILY
Qty: 30 TABLET | Refills: 10 | Status: SHIPPED | OUTPATIENT
Start: 2024-06-10

## 2024-06-21 ENCOUNTER — OFFICE VISIT (OUTPATIENT)
Dept: FAMILY MEDICINE | Facility: CLINIC | Age: 54
End: 2024-06-21
Payer: COMMERCIAL

## 2024-06-21 VITALS
OXYGEN SATURATION: 98 % | RESPIRATION RATE: 16 BRPM | DIASTOLIC BLOOD PRESSURE: 78 MMHG | WEIGHT: 180.04 LBS | HEIGHT: 66 IN | HEART RATE: 83 BPM | TEMPERATURE: 97.8 F | BODY MASS INDEX: 28.93 KG/M2 | SYSTOLIC BLOOD PRESSURE: 120 MMHG

## 2024-06-21 DIAGNOSIS — N18.31 TYPE 2 DIABETES MELLITUS WITH STAGE 3A CHRONIC KIDNEY DISEASE, WITHOUT LONG-TERM CURRENT USE OF INSULIN (H): ICD-10-CM

## 2024-06-21 DIAGNOSIS — K08.89 LOOSE, TEETH: ICD-10-CM

## 2024-06-21 DIAGNOSIS — Z01.818 PREOPERATIVE EXAMINATION: Primary | ICD-10-CM

## 2024-06-21 DIAGNOSIS — E11.22 TYPE 2 DIABETES MELLITUS WITH STAGE 3A CHRONIC KIDNEY DISEASE, WITHOUT LONG-TERM CURRENT USE OF INSULIN (H): ICD-10-CM

## 2024-06-21 PROCEDURE — 99214 OFFICE O/P EST MOD 30 MIN: CPT | Performed by: FAMILY MEDICINE

## 2024-06-21 NOTE — PROGRESS NOTES
Preoperative Evaluation  [address]  [place] [address]  Phone: [phone]  Fax: [phone]  Primary Provider: Alonzo Florez MD  Pre-op Performing Provider: Alonzo Florez MD  Jun 21, 2024 6/21/2024   Surgical Information   What procedure is being done? Full mouth extraction   Facility or Hospital where procedure/surgery will be performed: [facility]   Who is doing the procedure / surgery? Pallavi Bhosale DDS   Date of surgery / procedure: 7/1/2024   Time of surgery / procedure: 1pm   Where do you plan to recover after surgery? at home with family        Fax number for surgical facility: [phone]    Assessment & Plan     The proposed surgical procedure is considered LOW risk.    Preoperative examination      Loose, teeth      Type 2 diabetes mellitus with stage 3a chronic kidney disease, without long-term current use of insulin (H)    He will stop the meloxicam he has been taking for knee arthritis, and use tylenol.          Antiplatelet or Anticoagulation Medication Instructions   - Patient is on no antiplatelet or anticoagulation medications.    Additional Medication Instructions  Take all scheduled medications on the day of surgery EXCEPT for modifications listed below:   - metformin: DO NOT TAKE day of surgery.    Recommendation  Approval given to proceed with proposed procedure, without further diagnostic evaluation.    Cathryn Polk is a 54 year old, presenting for the following:  Pre-Op Exam (Full mouth extraction)          6/21/2024     1:36 PM   Additional Questions   Roomed by antonio walt   Accompanied by daughter         6/21/2024   Forms   Any forms needing to be completed Yes        HPI related to upcoming procedure: Loose teeth.  Diabetes controlled, recent A1c 7.5        6/21/2024   Pre-Op Questionnaire   Have you ever had a heart attack or stroke? No   Have you ever had surgery on your heart or blood  vessels, such as a stent placement, a coronary artery bypass, or surgery on an artery in your head, neck, heart, or legs? No   Do you have chest pain with activity? (!) UNKNOWN    Do you have a history of heart failure? No   Do you currently have a cold, bronchitis or symptoms of other infection? No   Do you have a cough, shortness of breath, or wheezing? No   Do you or anyone in your family have previous history of blood clots? No   Do you or does anyone in your family have a serious bleeding problem such as prolonged bleeding following surgeries or cuts? No   Have you ever had problems with anemia or been told to take iron pills? (!) UNKNOWN    Have you had any abnormal blood loss such as black, tarry or bloody stools? No   Have you ever had a blood transfusion? (!) YES   Have you ever had a transfusion reaction? No   Are you willing to have a blood transfusion if it is medically needed before, during, or after your surgery? Yes   Have you or any of your relatives ever had problems with anesthesia? No   Do you have sleep apnea, excessive snoring or daytime drowsiness? No   Do you have any artifical heart valves or other implanted medical devices like a pacemaker, defibrillator, or continuous glucose monitor? No   Do you have artificial joints? (!) YES   Are you allergic to latex? No            Preoperative Review of    reviewed - no record of controlled substances prescribed.          Patient Active Problem List    Diagnosis Date Noted    Hepatic fibrosis 04/06/2024     Priority: Medium    Mixed hyperlipidemia 04/06/2024     Priority: Medium    Essential hypertension 11/10/2023     Priority: Medium    Acute renal failure superimposed on stage 3 chronic kidney disease, unspecified acute renal failure type      Priority: Medium     IMO Regulatory Load OCT 2020        Osteoarthritis of both knees, unspecified osteoarthritis type 11/27/2019     Priority: Medium    Hemoptysis      Priority: Medium    Cavitary  lesion of lung      Priority: Medium    Hypoxia      Priority: Medium    Pleural effusion on right      Priority: Medium    Stage 3 chronic kidney disease (H) 06/06/2019     Priority: Medium    Iron deficiency anemia due to chronic blood loss 06/06/2019     Priority: Medium    Tuberculosis 05/31/2019     Priority: Medium    Weight loss      Priority: Medium    Type 2 diabetes mellitus with stage 3 chronic kidney disease, without long-term current use of insulin (H) 05/21/2019     Priority: Medium    Pulmonary tuberculosis with cavitation 05/21/2019     Priority: Medium      Past Medical History:   Diagnosis Date    DM2 (diabetes mellitus, type 2) (H)     Hemoptysis     Pulmonary tuberculosis with cavitation      Past Surgical History:   Procedure Laterality Date    NO PAST SURGERIES       Current Outpatient Medications   Medication Sig Dispense Refill    ACCU-CHEK GUIDE test strip TEST DAILY 50 strip 11    acetaminophen (TYLENOL) 325 MG tablet TAKE 2 TABLETS(650 MG) BY MOUTH EVERY 4 HOURS AS NEEDED 100 tablet 0    atorvastatin (LIPITOR) 20 MG tablet TAKE 1 TABLET(20 MG) BY MOUTH DAILY 30 tablet 10    blood glucose (NO BRAND SPECIFIED) lancets standard Use to test blood sugar 1 time daily or as directed. 100 lancet 3    blood glucose (NO BRAND SPECIFIED) test strip Use to test blood sugar 1 time daily or as directed. 100 strip 3    blood glucose meter (GLUCOMETER) [BLOOD GLUCOSE METER (GLUCOMETER)] Use 1 each As Directed daily. Dispense glucometer brand per patient's insurance at pharmacy discretion. 1 each 0    blood glucose monitoring (NO BRAND SPECIFIED) meter device kit Use to test blood sugar 1 time daily or as directed. 1 kit 0    blood glucose monitoring (SOFTCLIX) lancets 1 each by In Vitro route daily 100 each 3    FEROSUL 325 (65 Fe) MG tablet TAKE 1 TABLET(325 MG) BY MOUTH DAILY WITH BREAKFAST 90 tablet 2    generic lancets (FINGERSTIX LANCETS) [GENERIC LANCETS (FINGERSTIX LANCETS)] Use daily.  Dispense  "brand per patient's insurance at pharmacy discretion. 50 each 11    lisinopril (ZESTRIL) 5 MG tablet Take 1 tablet (5 mg) by mouth daily 90 tablet 3    metFORMIN (GLUCOPHAGE XR) 500 MG 24 hr tablet Take 3 tablets (1,500 mg) by mouth daily 270 tablet 3    triamcinolone (KENALOG) 0.1 % external cream APPLY 1 GRAM TOPICALLY TO LOWER LEGS TWICE DAILY 80 g 5       No Known Allergies     Social History     Tobacco Use    Smoking status: Former     Current packs/day: 0.00     Types: Cigarettes     Quit date: 10/24/2018     Years since quittin.6     Passive exposure: Never    Smokeless tobacco: Former     Types: Chew    Tobacco comments:     no passive exposure   Substance Use Topics    Alcohol use: Not Currently     Comment: Alcoholic Drinks/day: Beer.       History   Drug Use No               Objective    /78   Pulse 83   Temp 97.8  F (36.6  C) (Oral)   Resp 16   Ht 1.676 m (5' 6\")   Wt 81.7 kg (180 lb 0.6 oz)   SpO2 98%   BMI 29.06 kg/m     Estimated body mass index is 29.06 kg/m  as calculated from the following:    Height as of this encounter: 1.676 m (5' 6\").    Weight as of this encounter: 81.7 kg (180 lb 0.6 oz).    Eyes: EOM full, pupils normal, conjunctivae normal  Ears: TM's and canals normal  Oropharynx: normal  Neck: supple without adenopathy or thyromegaly  Lungs: normal  Heart: regular rhythm, normal rate, no murmur  Abdomen: no HSM, mass or tenderness  Extremities: FROM, normal strength and sensation  Using a cane    Hb in February 15.7    Recent Results (from the past 720 hour(s))   Hemoglobin A1c    Collection Time: 24 10:51 AM   Result Value Ref Range    Hemoglobin A1C 7.5 (H) 0.0 - 5.6 %   Basic metabolic panel  (Ca, Cl, CO2, Creat, Gluc, K, Na, BUN)    Collection Time: 24 10:51 AM   Result Value Ref Range    Sodium 138 135 - 145 mmol/L    Potassium 5.0 3.4 - 5.3 mmol/L    Chloride 103 98 - 107 mmol/L    Carbon Dioxide (CO2) 22 22 - 29 mmol/L    Anion Gap 13 7 - 15 mmol/L    " Urea Nitrogen 13.6 6.0 - 20.0 mg/dL    Creatinine 1.90 (H) 0.67 - 1.17 mg/dL    GFR Estimate 41 (L) >60 mL/min/1.73m2    Calcium 9.9 8.6 - 10.0 mg/dL    Glucose 119 (H) 70 - 99 mg/dL

## 2024-06-27 ENCOUNTER — TRANSFERRED RECORDS (OUTPATIENT)
Dept: HEALTH INFORMATION MANAGEMENT | Facility: CLINIC | Age: 54
End: 2024-06-27
Payer: COMMERCIAL

## 2024-07-25 ENCOUNTER — TRANSFERRED RECORDS (OUTPATIENT)
Dept: HEALTH INFORMATION MANAGEMENT | Facility: CLINIC | Age: 54
End: 2024-07-25
Payer: COMMERCIAL

## 2024-07-25 DIAGNOSIS — D50.8 OTHER IRON DEFICIENCY ANEMIA: ICD-10-CM

## 2024-07-25 RX ORDER — FERROUS SULFATE 325(65) MG
TABLET ORAL
Qty: 90 TABLET | Refills: 3 | Status: SHIPPED | OUTPATIENT
Start: 2024-07-25

## 2024-08-23 ENCOUNTER — TRANSFERRED RECORDS (OUTPATIENT)
Dept: MULTI SPECIALTY CLINIC | Facility: CLINIC | Age: 54
End: 2024-08-23

## 2024-08-23 LAB — RETINOPATHY: NORMAL

## 2024-09-05 DIAGNOSIS — I10 ESSENTIAL HYPERTENSION: ICD-10-CM

## 2024-09-05 RX ORDER — LISINOPRIL 5 MG/1
5 TABLET ORAL DAILY
Qty: 90 TABLET | Refills: 3 | OUTPATIENT
Start: 2024-09-05

## 2024-10-04 ENCOUNTER — OFFICE VISIT (OUTPATIENT)
Dept: FAMILY MEDICINE | Facility: CLINIC | Age: 54
End: 2024-10-04
Attending: FAMILY MEDICINE
Payer: COMMERCIAL

## 2024-10-04 VITALS
OXYGEN SATURATION: 98 % | TEMPERATURE: 98.1 F | BODY MASS INDEX: 26.85 KG/M2 | DIASTOLIC BLOOD PRESSURE: 72 MMHG | RESPIRATION RATE: 16 BRPM | WEIGHT: 167.08 LBS | HEART RATE: 80 BPM | HEIGHT: 66 IN | SYSTOLIC BLOOD PRESSURE: 122 MMHG

## 2024-10-04 DIAGNOSIS — N18.31 TYPE 2 DIABETES MELLITUS WITH STAGE 3A CHRONIC KIDNEY DISEASE, WITHOUT LONG-TERM CURRENT USE OF INSULIN (H): Primary | ICD-10-CM

## 2024-10-04 DIAGNOSIS — E11.22 TYPE 2 DIABETES MELLITUS WITH STAGE 3A CHRONIC KIDNEY DISEASE, WITHOUT LONG-TERM CURRENT USE OF INSULIN (H): Primary | ICD-10-CM

## 2024-10-04 DIAGNOSIS — R05.1 ACUTE COUGH: ICD-10-CM

## 2024-10-04 DIAGNOSIS — Z23 NEED FOR VACCINATION: ICD-10-CM

## 2024-10-04 DIAGNOSIS — E78.2 MIXED HYPERLIPIDEMIA: ICD-10-CM

## 2024-10-04 LAB
ALBUMIN SERPL BCG-MCNC: 4 G/DL (ref 3.5–5.2)
ALP SERPL-CCNC: 79 U/L (ref 40–150)
ALT SERPL W P-5'-P-CCNC: 26 U/L (ref 0–70)
ANION GAP SERPL CALCULATED.3IONS-SCNC: 12 MMOL/L (ref 7–15)
AST SERPL W P-5'-P-CCNC: 25 U/L (ref 0–45)
BILIRUB SERPL-MCNC: 0.3 MG/DL
BUN SERPL-MCNC: 15.2 MG/DL (ref 6–20)
CALCIUM SERPL-MCNC: 9.2 MG/DL (ref 8.8–10.4)
CHLORIDE SERPL-SCNC: 107 MMOL/L (ref 98–107)
CHOLEST SERPL-MCNC: 118 MG/DL
CREAT SERPL-MCNC: 1.72 MG/DL (ref 0.67–1.17)
CREAT UR-MCNC: 115 MG/DL
EGFRCR SERPLBLD CKD-EPI 2021: 46 ML/MIN/1.73M2
EST. AVERAGE GLUCOSE BLD GHB EST-MCNC: 126 MG/DL
FASTING STATUS PATIENT QL REPORTED: YES
FASTING STATUS PATIENT QL REPORTED: YES
GLUCOSE SERPL-MCNC: 129 MG/DL (ref 70–99)
HBA1C MFR BLD: 6 % (ref 0–5.6)
HCO3 SERPL-SCNC: 22 MMOL/L (ref 22–29)
HDLC SERPL-MCNC: 44 MG/DL
HGB BLD-MCNC: 13.7 G/DL (ref 13.3–17.7)
LDLC SERPL CALC-MCNC: 46 MG/DL
MICROALBUMIN UR-MCNC: 3484 MG/L
MICROALBUMIN/CREAT UR: 3029.57 MG/G CR (ref 0–17)
NONHDLC SERPL-MCNC: 74 MG/DL
POTASSIUM SERPL-SCNC: 3.6 MMOL/L (ref 3.4–5.3)
PROT SERPL-MCNC: 7.4 G/DL (ref 6.4–8.3)
SODIUM SERPL-SCNC: 141 MMOL/L (ref 135–145)
TRIGL SERPL-MCNC: 139 MG/DL

## 2024-10-04 PROCEDURE — 85018 HEMOGLOBIN: CPT | Performed by: FAMILY MEDICINE

## 2024-10-04 PROCEDURE — 80053 COMPREHEN METABOLIC PANEL: CPT | Performed by: FAMILY MEDICINE

## 2024-10-04 PROCEDURE — 82570 ASSAY OF URINE CREATININE: CPT | Performed by: FAMILY MEDICINE

## 2024-10-04 PROCEDURE — 36415 COLL VENOUS BLD VENIPUNCTURE: CPT | Performed by: FAMILY MEDICINE

## 2024-10-04 PROCEDURE — 80061 LIPID PANEL: CPT | Performed by: FAMILY MEDICINE

## 2024-10-04 PROCEDURE — 99214 OFFICE O/P EST MOD 30 MIN: CPT | Mod: 25 | Performed by: FAMILY MEDICINE

## 2024-10-04 PROCEDURE — 82043 UR ALBUMIN QUANTITATIVE: CPT | Performed by: FAMILY MEDICINE

## 2024-10-04 PROCEDURE — 90471 IMMUNIZATION ADMIN: CPT | Performed by: FAMILY MEDICINE

## 2024-10-04 PROCEDURE — 83036 HEMOGLOBIN GLYCOSYLATED A1C: CPT | Performed by: FAMILY MEDICINE

## 2024-10-04 PROCEDURE — 90673 RIV3 VACCINE NO PRESERV IM: CPT | Performed by: FAMILY MEDICINE

## 2024-10-04 RX ORDER — BENZONATATE 200 MG/1
200 CAPSULE ORAL 3 TIMES DAILY PRN
Qty: 60 CAPSULE | Refills: 3 | Status: SHIPPED | OUTPATIENT
Start: 2024-10-04

## 2024-10-04 NOTE — PROGRESS NOTES
"  Assessment & Plan     Type 2 diabetes mellitus with stage 3a chronic kidney disease, without long-term current use of insulin (H)    At goal.    Depending upon GFR result, we might decrease metformin to 2 per day.    Recheck in 4 months.      - PRIMARY CARE FOLLOW-UP SCHEDULING  - Hemoglobin  - Albumin Random Urine Quantitative with Creat Ratio  - Hemoglobin A1c  - Hemoglobin  - Hemoglobin A1c  - Albumin Random Urine Quantitative with Creat Ratio  - PRIMARY CARE FOLLOW-UP SCHEDULING    Mixed hyperlipidemia    Stable.    - Lipid panel reflex to direct LDL Fasting  - Comprehensive metabolic panel (BMP + Alb, Alk Phos, ALT, AST, Total. Bili, TP)  - Lipid panel reflex to direct LDL Fasting  - Comprehensive metabolic panel (BMP + Alb, Alk Phos, ALT, AST, Total. Bili, TP)    Acute cough    Occasional.  No fever or sputum production.    - benzonatate (TESSALON) 200 MG capsule  Dispense: 60 capsule; Refill: 3    Need for vaccination    - INFLUENZA VACCINE TRIVALENT(FLUBLOK)      The longitudinal plan of care for the diagnosis(es)/condition(s) as documented were addressed during this visit. Due to the added complexity in care, I will continue to support Jam in the subsequent management and with ongoing continuity of care.      Ordering of each unique test        BMI  Estimated body mass index is 26.85 kg/m  as calculated from the following:    Height as of this encounter: 1.68 m (5' 6.14\").    Weight as of this encounter: 75.8 kg (167 lb 1.3 oz).             Subjective   Jam is a 54 year old, presenting for the following health issues:  Diabetes and Imm/Inj (Pt would like to get a flu shot today )        10/4/2024    10:13 AM   Additional Questions   Roomed by antonio godoy   Accompanied by daughter     Via the Health Maintenance questionnaire, the patient has reported the following services have been completed , this information has been sent to the abstraction team.  History of Present Illness       Diabetes:   He presents for " follow up of diabetes.  He is checking home blood glucose one time daily.   He checks blood glucose before meals.  Blood glucose is sometimes over 200 and never under 70. He is aware of hypoglycemia symptoms including shakiness, dizziness, weakness, blurred vision and confusion.    He has no concerns regarding his diabetes at this time.  He is having burning in feet and blurry vision.  The patient has had a diabetic eye exam in the last 12 months. Eye exam performed on 08/23/2024. Location of last eye exam [facility].                  GFR was 41.    Does not use NSAID.    Fasting.    Current Outpatient Medications   Medication Sig Dispense Refill    ACCU-CHEK GUIDE test strip TEST DAILY 50 strip 11    acetaminophen (TYLENOL) 325 MG tablet TAKE 2 TABLETS(650 MG) BY MOUTH EVERY 4 HOURS AS NEEDED 100 tablet 0    atorvastatin (LIPITOR) 20 MG tablet TAKE 1 TABLET(20 MG) BY MOUTH DAILY 30 tablet 10    benzonatate (TESSALON) 200 MG capsule Take 1 capsule (200 mg) by mouth 3 times daily as needed for cough. 60 capsule 3    blood glucose (NO BRAND SPECIFIED) lancets standard Use to test blood sugar 1 time daily or as directed. 100 lancet 3    blood glucose (NO BRAND SPECIFIED) test strip Use to test blood sugar 1 time daily or as directed. 100 strip 3    blood glucose meter (GLUCOMETER) [BLOOD GLUCOSE METER (GLUCOMETER)] Use 1 each As Directed daily. Dispense glucometer brand per patient's insurance at pharmacy discretion. 1 each 0    blood glucose monitoring (NO BRAND SPECIFIED) meter device kit Use to test blood sugar 1 time daily or as directed. 1 kit 0    blood glucose monitoring (SOFTCLIX) lancets 1 each by In Vitro route daily 100 each 3    ferrous sulfate (FEROSUL) 325 (65 Fe) MG tablet TAKE 1 TABLET(325 MG) BY MOUTH DAILY WITH BREAKFAST 90 tablet 3    generic lancets (FINGERSTIX LANCETS) [GENERIC LANCETS (FINGERSTIX LANCETS)] Use daily.  Dispense brand per patient's insurance at pharmacy discretion. 50 each 11     "lisinopril (ZESTRIL) 5 MG tablet Take 1 tablet (5 mg) by mouth daily 90 tablet 3    metFORMIN (GLUCOPHAGE XR) 500 MG 24 hr tablet Take 3 tablets (1,500 mg) by mouth daily 270 tablet 3    triamcinolone (KENALOG) 0.1 % external cream APPLY 1 GRAM TOPICALLY TO LOWER LEGS TWICE DAILY 80 g 5           Objective    /72   Pulse 80   Temp 98.1  F (36.7  C) (Oral)   Resp 16   Ht 1.68 m (5' 6.14\")   Wt 75.8 kg (167 lb 1.3 oz)   SpO2 98%   BMI 26.85 kg/m    Body mass index is 26.85 kg/m .  Physical Exam     Heart normal  Lungs normal  Using a cane    A1c 6.0, had been 7.5          Signed Electronically by: Alonzo Florez MD, MD    "

## 2024-10-08 ENCOUNTER — TELEPHONE (OUTPATIENT)
Dept: FAMILY MEDICINE | Facility: CLINIC | Age: 54
End: 2024-10-08
Payer: COMMERCIAL

## 2024-10-08 DIAGNOSIS — N18.31 TYPE 2 DIABETES MELLITUS WITH STAGE 3A CHRONIC KIDNEY DISEASE, WITHOUT LONG-TERM CURRENT USE OF INSULIN (H): ICD-10-CM

## 2024-10-08 DIAGNOSIS — E11.22 TYPE 2 DIABETES MELLITUS WITH STAGE 3A CHRONIC KIDNEY DISEASE, WITHOUT LONG-TERM CURRENT USE OF INSULIN (H): ICD-10-CM

## 2024-10-08 RX ORDER — METFORMIN HYDROCHLORIDE 500 MG/1
1000 TABLET, EXTENDED RELEASE ORAL DAILY
Qty: 180 TABLET | Refills: 3 | Status: SHIPPED | OUTPATIENT
Start: 2024-10-08

## 2024-10-08 NOTE — TELEPHONE ENCOUNTER
Please call pt.  Because his A1c is good at 6.0, and GFR is mildly low at 46, he should decrease metformin to 2 tablets per day.  I sent Rx to pharmacy.  Thanks - [facility]      Spoke to daughter, Angela Mckeon ([facility] on file) to relay provider test result above.  Pharmacy medication sent to provided.    Juventino Horner RN  [facility]

## 2024-11-05 DIAGNOSIS — I10 ESSENTIAL HYPERTENSION: ICD-10-CM

## 2024-11-05 RX ORDER — LISINOPRIL 5 MG/1
5 TABLET ORAL DAILY
Qty: 90 TABLET | Refills: 3 | Status: SHIPPED | OUTPATIENT
Start: 2024-11-05

## 2024-12-14 ENCOUNTER — HEALTH MAINTENANCE LETTER (OUTPATIENT)
Age: 54
End: 2024-12-14

## 2025-02-18 ENCOUNTER — OFFICE VISIT (OUTPATIENT)
Dept: FAMILY MEDICINE | Facility: CLINIC | Age: 55
End: 2025-02-18
Attending: FAMILY MEDICINE
Payer: COMMERCIAL

## 2025-02-18 VITALS
OXYGEN SATURATION: 98 % | WEIGHT: 175 LBS | HEIGHT: 66 IN | HEART RATE: 94 BPM | SYSTOLIC BLOOD PRESSURE: 126 MMHG | TEMPERATURE: 97.2 F | RESPIRATION RATE: 16 BRPM | BODY MASS INDEX: 28.12 KG/M2 | DIASTOLIC BLOOD PRESSURE: 84 MMHG

## 2025-02-18 DIAGNOSIS — I10 ESSENTIAL HYPERTENSION: ICD-10-CM

## 2025-02-18 DIAGNOSIS — N18.31 TYPE 2 DIABETES MELLITUS WITH STAGE 3A CHRONIC KIDNEY DISEASE, WITHOUT LONG-TERM CURRENT USE OF INSULIN (H): Primary | ICD-10-CM

## 2025-02-18 DIAGNOSIS — D50.8 OTHER IRON DEFICIENCY ANEMIA: ICD-10-CM

## 2025-02-18 DIAGNOSIS — E11.22 TYPE 2 DIABETES MELLITUS WITH STAGE 3A CHRONIC KIDNEY DISEASE, WITHOUT LONG-TERM CURRENT USE OF INSULIN (H): Primary | ICD-10-CM

## 2025-02-18 LAB
ANION GAP SERPL CALCULATED.3IONS-SCNC: 15 MMOL/L (ref 7–15)
BUN SERPL-MCNC: 18.4 MG/DL (ref 6–20)
CALCIUM SERPL-MCNC: 9.8 MG/DL (ref 8.8–10.4)
CHLORIDE SERPL-SCNC: 106 MMOL/L (ref 98–107)
CREAT SERPL-MCNC: 1.85 MG/DL (ref 0.67–1.17)
EGFRCR SERPLBLD CKD-EPI 2021: 42 ML/MIN/1.73M2
EST. AVERAGE GLUCOSE BLD GHB EST-MCNC: 157 MG/DL
GLUCOSE SERPL-MCNC: 131 MG/DL (ref 70–99)
HBA1C MFR BLD: 7.1 % (ref 0–5.6)
HCO3 SERPL-SCNC: 18 MMOL/L (ref 22–29)
HGB BLD-MCNC: 14.1 G/DL (ref 13.3–17.7)
IRON BINDING CAPACITY (ROCHE): 240 UG/DL (ref 240–430)
IRON SATN MFR SERPL: 14 % (ref 15–46)
IRON SERPL-MCNC: 33 UG/DL (ref 61–157)
POTASSIUM SERPL-SCNC: 4.4 MMOL/L (ref 3.4–5.3)
SODIUM SERPL-SCNC: 139 MMOL/L (ref 135–145)

## 2025-02-18 PROCEDURE — 3079F DIAST BP 80-89 MM HG: CPT | Performed by: FAMILY MEDICINE

## 2025-02-18 PROCEDURE — 3074F SYST BP LT 130 MM HG: CPT | Performed by: FAMILY MEDICINE

## 2025-02-18 PROCEDURE — 36415 COLL VENOUS BLD VENIPUNCTURE: CPT | Performed by: FAMILY MEDICINE

## 2025-02-18 PROCEDURE — 83550 IRON BINDING TEST: CPT | Performed by: FAMILY MEDICINE

## 2025-02-18 PROCEDURE — G2211 COMPLEX E/M VISIT ADD ON: HCPCS | Performed by: FAMILY MEDICINE

## 2025-02-18 PROCEDURE — 83540 ASSAY OF IRON: CPT | Performed by: FAMILY MEDICINE

## 2025-02-18 PROCEDURE — 83036 HEMOGLOBIN GLYCOSYLATED A1C: CPT | Performed by: FAMILY MEDICINE

## 2025-02-18 PROCEDURE — 80048 BASIC METABOLIC PNL TOTAL CA: CPT | Performed by: FAMILY MEDICINE

## 2025-02-18 PROCEDURE — 99214 OFFICE O/P EST MOD 30 MIN: CPT | Performed by: FAMILY MEDICINE

## 2025-02-18 PROCEDURE — 85018 HEMOGLOBIN: CPT | Performed by: FAMILY MEDICINE

## 2025-02-18 NOTE — PROGRESS NOTES
"  {PROVIDER CHARTING PREFERENCE:974913}    Subjective   Jam is a 55 year old, presenting for the following health issues:  Diabetes      2/18/2025    11:42 AM   Additional Questions   Roomed by Yamile BOWDEN MA   Accompanied by DAUGHTER     History of Present Illness       Diabetes:   He presents for follow up of diabetes.  He is checking home blood glucose one time daily.   He checks blood glucose before meals.  Blood glucose is never over 200 and never under 70. He is aware of hypoglycemia symptoms including shakiness, dizziness, weakness, lethargy, blurred vision and confusion.    He has no concerns regarding his diabetes at this time.  He is having numbness in feet, burning in feet and blurry vision.                {MA/LPN/RN Pre-Provider Visit Orders- hCG/UA/Strep (Optional):351285}  {SUPERLIST (Optional):399564}  {additonal problems for provider to add (Optional):125466}    {ROS Picklists (Optional):365092}      Objective    BP (!) 155/88   Pulse 94   Temp 97.2  F (36.2  C) (Temporal)   Resp 16   Ht 1.68 m (5' 6.14\")   Wt 79.4 kg (175 lb)   SpO2 98%   BMI 28.13 kg/m    Body mass index is 28.13 kg/m .  Physical Exam   {Exam List (Optional):114002}    {Diagnostic Test Results (Optional):166348}        Signed Electronically by: Alonzo Florez MD, MD  {Email feedback regarding this note to [email or web address]   :[id number]}  " "Dispense Refill    ACCU-CHEK GUIDE test strip TEST DAILY 50 strip 11    acetaminophen (TYLENOL) 325 MG tablet TAKE 2 TABLETS(650 MG) BY MOUTH EVERY 4 HOURS AS NEEDED 100 tablet 0    atorvastatin (LIPITOR) 20 MG tablet TAKE 1 TABLET(20 MG) BY MOUTH DAILY 30 tablet 10    benzonatate (TESSALON) 200 MG capsule Take 1 capsule (200 mg) by mouth 3 times daily as needed for cough. 60 capsule 3    blood glucose (NO BRAND SPECIFIED) lancets standard Use to test blood sugar 1 time daily or as directed. 100 lancet 3    blood glucose (NO BRAND SPECIFIED) test strip Use to test blood sugar 1 time daily or as directed. 100 strip 3    blood glucose meter (GLUCOMETER) [BLOOD GLUCOSE METER (GLUCOMETER)] Use 1 each As Directed daily. Dispense glucometer brand per patient's insurance at pharmacy discretion. 1 each 0    blood glucose monitoring (NO BRAND SPECIFIED) meter device kit Use to test blood sugar 1 time daily or as directed. 1 kit 0    blood glucose monitoring (SOFTCLIX) lancets 1 each by In Vitro route daily 100 each 3    ferrous sulfate (FEROSUL) 325 (65 Fe) MG tablet TAKE 1 TABLET(325 MG) BY MOUTH DAILY WITH BREAKFAST 90 tablet 3    generic lancets (FINGERSTIX LANCETS) [GENERIC LANCETS (FINGERSTIX LANCETS)] Use daily.  Dispense brand per patient's insurance at pharmacy discretion. 50 each 11    lisinopril (ZESTRIL) 5 MG tablet TAKE 1 TABLET(5 MG) BY MOUTH DAILY 90 tablet 3    metFORMIN (GLUCOPHAGE XR) 500 MG 24 hr tablet Take 2 tablets (1,000 mg) by mouth daily. 180 tablet 3    triamcinolone (KENALOG) 0.1 % external cream APPLY 1 GRAM TOPICALLY TO LOWER LEGS TWICE DAILY 80 g 5                   Objective    /84   Pulse 94   Temp 97.2  F (36.2  C) (Temporal)   Resp 16   Ht 1.68 m (5' 6.14\")   Wt 79.4 kg (175 lb)   SpO2 98%   BMI 28.13 kg/m    Body mass index is 28.13 kg/m .  Physical Exam     Heart normal  Lungs normal  Using a cane          Signed Electronically by: Alonzo Florez MD    "

## 2025-02-28 ENCOUNTER — TRANSFERRED RECORDS (OUTPATIENT)
Dept: HEALTH INFORMATION MANAGEMENT | Facility: CLINIC | Age: 55
End: 2025-02-28
Payer: COMMERCIAL

## 2025-03-12 ENCOUNTER — TRANSFERRED RECORDS (OUTPATIENT)
Dept: HEALTH INFORMATION MANAGEMENT | Facility: CLINIC | Age: 55
End: 2025-03-12
Payer: COMMERCIAL

## 2025-06-18 ENCOUNTER — OFFICE VISIT (OUTPATIENT)
Dept: FAMILY MEDICINE | Facility: CLINIC | Age: 55
End: 2025-06-18
Payer: COMMERCIAL

## 2025-06-18 VITALS
WEIGHT: 178.4 LBS | RESPIRATION RATE: 16 BRPM | SYSTOLIC BLOOD PRESSURE: 134 MMHG | OXYGEN SATURATION: 97 % | BODY MASS INDEX: 28.67 KG/M2 | HEART RATE: 92 BPM | DIASTOLIC BLOOD PRESSURE: 86 MMHG | HEIGHT: 66 IN | TEMPERATURE: 98.3 F

## 2025-06-18 DIAGNOSIS — E11.22 TYPE 2 DIABETES MELLITUS WITH STAGE 3A CHRONIC KIDNEY DISEASE, WITHOUT LONG-TERM CURRENT USE OF INSULIN (H): Primary | ICD-10-CM

## 2025-06-18 DIAGNOSIS — L30.9 DERMATITIS: ICD-10-CM

## 2025-06-18 DIAGNOSIS — N18.31 TYPE 2 DIABETES MELLITUS WITH STAGE 3A CHRONIC KIDNEY DISEASE, WITHOUT LONG-TERM CURRENT USE OF INSULIN (H): Primary | ICD-10-CM

## 2025-06-18 DIAGNOSIS — I10 ESSENTIAL HYPERTENSION: ICD-10-CM

## 2025-06-18 DIAGNOSIS — Z76.0 ENCOUNTER FOR MEDICATION REFILL: ICD-10-CM

## 2025-06-18 DIAGNOSIS — H04.123 DRY EYES: ICD-10-CM

## 2025-06-18 LAB
EST. AVERAGE GLUCOSE BLD GHB EST-MCNC: 177 MG/DL
HBA1C MFR BLD: 7.8 % (ref 0–5.6)
HOLD SPECIMEN: NORMAL

## 2025-06-18 PROCEDURE — 3079F DIAST BP 80-89 MM HG: CPT | Performed by: FAMILY MEDICINE

## 2025-06-18 PROCEDURE — 99214 OFFICE O/P EST MOD 30 MIN: CPT | Performed by: FAMILY MEDICINE

## 2025-06-18 PROCEDURE — G2211 COMPLEX E/M VISIT ADD ON: HCPCS | Performed by: FAMILY MEDICINE

## 2025-06-18 PROCEDURE — 36415 COLL VENOUS BLD VENIPUNCTURE: CPT | Performed by: FAMILY MEDICINE

## 2025-06-18 PROCEDURE — 83036 HEMOGLOBIN GLYCOSYLATED A1C: CPT | Performed by: FAMILY MEDICINE

## 2025-06-18 PROCEDURE — 3075F SYST BP GE 130 - 139MM HG: CPT | Performed by: FAMILY MEDICINE

## 2025-06-18 RX ORDER — TRIAMCINOLONE ACETONIDE 1 MG/G
CREAM TOPICAL 2 TIMES DAILY
Qty: 80 G | Refills: 5 | Status: SHIPPED | OUTPATIENT
Start: 2025-06-18

## 2025-06-18 RX ORDER — ASPIRIN 81 MG
TABLET,CHEWABLE ORAL
Qty: 30 ML | Refills: 11 | Status: SHIPPED | OUTPATIENT
Start: 2025-06-18

## 2025-06-18 RX ORDER — LISINOPRIL 10 MG/1
10 TABLET ORAL DAILY
Qty: 90 TABLET | Refills: 3 | Status: SHIPPED | OUTPATIENT
Start: 2025-06-18

## 2025-06-18 NOTE — PROGRESS NOTES
"  {PROVIDER CHARTING PREFERENCE:661614}    Subjective   Jam is a 55 year old, presenting for the following health issues:  Diabetes, right ear feels plugged , right eye blurry , and muscle tension       6/18/2025     8:54 AM   Additional Questions   Roomed by radu andre   Accompanied by jaycee     History of Present Illness       Diabetes:   He presents for follow up of diabetes.  He is checking home blood glucose one time daily.   He checks blood glucose before meals.  Blood glucose is never over 200 and sometimes under 70. He is aware of hypoglycemia symptoms including shakiness, dizziness, weakness, blurred vision and confusion.    He has no concerns regarding his diabetes at this time.  He is having burning in feet and blurry vision.            He eats 2-3 servings of fruits and vegetables daily.He consumes 0 sweetened beverage(s) daily.He exercises with enough effort to increase his heart rate 10 to 19 minutes per day.  He exercises with enough effort to increase his heart rate 4 days per week.   He is taking medications regularly.        {MA/LPN/RN Pre-Provider Visit Orders- hCG/UA/Strep (Optional):591964}  {SUPERLIST (Optional):184299}  {additonal problems for provider to add (Optional):071451}    {ROS Picklists (Optional):139642}      Objective    BP (!) 148/93   Pulse 92   Temp 98.3  F (36.8  C) (Oral)   Resp 16   Ht 1.68 m (5' 6.14\")   Wt 80.9 kg (178 lb 6.4 oz)   SpO2 97%   BMI 28.67 kg/m    Body mass index is 28.67 kg/m .  Physical Exam   {Exam List (Optional):316403}    {Diagnostic Test Results (Optional):593578}        Signed Electronically by: Alonzo Florez MD, MD  {Email feedback regarding this note to [email or web address]   :[id number]}  "         He eats 2-3 servings of fruits and vegetables daily.He consumes 0 sweetened beverage(s) daily.He exercises with enough effort to increase his heart rate 10 to 19 minutes per day.  He exercises with enough effort to increase his heart rate 4 days per week.   He is taking medications regularly.      Fasting.    Current Outpatient Medications   Medication Sig Dispense Refill    ACCU-CHEK GUIDE test strip TEST DAILY 50 strip 11    acetaminophen (TYLENOL) 325 MG tablet TAKE 2 TABLETS(650 MG) BY MOUTH EVERY 4 HOURS AS NEEDED 100 tablet 0    atorvastatin (LIPITOR) 20 MG tablet TAKE 1 TABLET(20 MG) BY MOUTH DAILY 30 tablet 2    benzonatate (TESSALON) 200 MG capsule Take 1 capsule (200 mg) by mouth 3 times daily as needed for cough. 60 capsule 3    blood glucose (NO BRAND SPECIFIED) lancets standard Use to test blood sugar 1 time daily or as directed. 100 lancet 3    blood glucose (NO BRAND SPECIFIED) test strip Use to test blood sugar 1 time daily or as directed. 100 strip 3    blood glucose meter (GLUCOMETER) [BLOOD GLUCOSE METER (GLUCOMETER)] Use 1 each As Directed daily. Dispense glucometer brand per patient's insurance at pharmacy discretion. 1 each 0    blood glucose monitoring (NO BRAND SPECIFIED) meter device kit Use to test blood sugar 1 time daily or as directed. 1 kit 0    blood glucose monitoring (SOFTCLIX) lancets 1 each by In Vitro route daily 100 each 3    ferrous sulfate (FEROSUL) 325 (65 Fe) MG tablet TAKE 1 TABLET(325 MG) BY MOUTH DAILY WITH BREAKFAST 90 tablet 3    generic lancets (FINGERSTIX LANCETS) [GENERIC LANCETS (FINGERSTIX LANCETS)] Use daily.  Dispense brand per patient's insurance at pharmacy discretion. 50 each 11    lisinopril (ZESTRIL) 10 MG tablet Take 1 tablet (10 mg) by mouth daily. 90 tablet 3    metFORMIN (GLUCOPHAGE XR) 500 MG 24 hr tablet Take 2 tablets (1,000 mg) by mouth daily. 180 tablet 3    Propylene Glycol-Glycerin (ARTIFICIAL TEARS) 1-0.3 % SOLN 1 drop in both eyes 4 times  "daily 30 mL 11    triamcinolone (KENALOG) 0.1 % external cream Apply topically 2 times daily. 2 grams to legs 80 g 5                   Objective    /86   Pulse 92   Temp 98.3  F (36.8  C) (Oral)   Resp 16   Ht 1.68 m (5' 6.14\")   Wt 80.9 kg (178 lb 6.4 oz)   SpO2 97%   BMI 28.67 kg/m    Body mass index is 28.67 kg/m .  Physical Exam     Eyes normal except for right lower lid eversion, unchanged for 40 years.  Ears normal  Heart normal  Lungs normal  Abdomen normal  Left ankle: 2\" scaly area  Using a cane    A1c 7.8          Signed Electronically by: Alonzo Florez MD    "

## 2025-07-08 ENCOUNTER — TELEPHONE (OUTPATIENT)
Dept: FAMILY MEDICINE | Facility: CLINIC | Age: 55
End: 2025-07-08
Payer: COMMERCIAL

## 2025-07-08 DIAGNOSIS — H04.123 DRY EYES: Primary | ICD-10-CM

## 2025-07-09 RX ORDER — PROPYLENE GLYCOL 0.06 MG/ML
1 SOLUTION/ DROPS OPHTHALMIC 4 TIMES DAILY PRN
Qty: 10 ML | Refills: 11 | Status: SHIPPED | OUTPATIENT
Start: 2025-07-09

## 2025-07-09 NOTE — TELEPHONE ENCOUNTER
Dr. Florez-Please review and advise.    Thank you!  YESSENIA MenjivarN, RN-BC  [facility]

## 2025-09-03 DIAGNOSIS — R05.1 ACUTE COUGH: ICD-10-CM

## 2025-09-04 RX ORDER — BENZONATATE 200 MG/1
CAPSULE ORAL
Qty: 60 CAPSULE | Refills: 3 | Status: SHIPPED | OUTPATIENT
Start: 2025-09-04